# Patient Record
Sex: MALE | Race: WHITE | NOT HISPANIC OR LATINO | Employment: FULL TIME | URBAN - METROPOLITAN AREA
[De-identification: names, ages, dates, MRNs, and addresses within clinical notes are randomized per-mention and may not be internally consistent; named-entity substitution may affect disease eponyms.]

---

## 2024-09-07 ENCOUNTER — HOSPITAL ENCOUNTER (EMERGENCY)
Facility: HOSPITAL | Age: 59
Discharge: HOME | End: 2024-09-07
Attending: EMERGENCY MEDICINE
Payer: MEDICAID

## 2024-09-07 ENCOUNTER — APPOINTMENT (OUTPATIENT)
Dept: CT IMAGING | Facility: HOSPITAL | Age: 59
End: 2024-09-07
Payer: MEDICAID

## 2024-09-07 VITALS
BODY MASS INDEX: 32.93 KG/M2 | WEIGHT: 230 LBS | HEART RATE: 92 BPM | SYSTOLIC BLOOD PRESSURE: 165 MMHG | DIASTOLIC BLOOD PRESSURE: 92 MMHG | HEIGHT: 70 IN | TEMPERATURE: 98.1 F | RESPIRATION RATE: 16 BRPM | OXYGEN SATURATION: 97 %

## 2024-09-07 DIAGNOSIS — E11.65 TYPE 2 DIABETES MELLITUS WITH HYPERGLYCEMIA, UNSPECIFIED WHETHER LONG TERM INSULIN USE: ICD-10-CM

## 2024-09-07 DIAGNOSIS — R11.0 NAUSEA: ICD-10-CM

## 2024-09-07 DIAGNOSIS — R10.10 UPPER ABDOMINAL PAIN: Primary | ICD-10-CM

## 2024-09-07 LAB
ALBUMIN SERPL-MCNC: 4.2 G/DL (ref 3.5–5.2)
ALBUMIN/GLOB SERPL: 1.6 G/DL
ALP SERPL-CCNC: 57 U/L (ref 39–117)
ALT SERPL W P-5'-P-CCNC: 24 U/L (ref 1–41)
ANION GAP SERPL CALCULATED.3IONS-SCNC: 11 MMOL/L (ref 5–15)
AST SERPL-CCNC: 20 U/L (ref 1–40)
BACTERIA UR QL AUTO: NORMAL /HPF
BASOPHILS # BLD AUTO: 0.02 10*3/MM3 (ref 0–0.2)
BASOPHILS NFR BLD AUTO: 0.3 % (ref 0–1.5)
BILIRUB SERPL-MCNC: 0.3 MG/DL (ref 0–1.2)
BILIRUB UR QL STRIP: NEGATIVE
BUN SERPL-MCNC: 17 MG/DL (ref 6–20)
BUN/CREAT SERPL: 13.9 (ref 7–25)
CALCIUM SPEC-SCNC: 9.7 MG/DL (ref 8.6–10.5)
CHLORIDE SERPL-SCNC: 96 MMOL/L (ref 98–107)
CLARITY UR: CLEAR
CO2 SERPL-SCNC: 26 MMOL/L (ref 22–29)
COLOR UR: YELLOW
CREAT SERPL-MCNC: 1.22 MG/DL (ref 0.76–1.27)
DEPRECATED RDW RBC AUTO: 42.4 FL (ref 37–54)
EGFRCR SERPLBLD CKD-EPI 2021: 69.1 ML/MIN/1.73
EOSINOPHIL # BLD AUTO: 0.29 10*3/MM3 (ref 0–0.4)
EOSINOPHIL NFR BLD AUTO: 4 % (ref 0.3–6.2)
ERYTHROCYTE [DISTWIDTH] IN BLOOD BY AUTOMATED COUNT: 12.3 % (ref 12.3–15.4)
GLOBULIN UR ELPH-MCNC: 2.6 GM/DL
GLUCOSE SERPL-MCNC: 299 MG/DL (ref 65–99)
GLUCOSE UR STRIP-MCNC: ABNORMAL MG/DL
HCT VFR BLD AUTO: 50.2 % (ref 37.5–51)
HGB BLD-MCNC: 17.3 G/DL (ref 13–17.7)
HGB UR QL STRIP.AUTO: NEGATIVE
HYALINE CASTS UR QL AUTO: NORMAL /LPF
IMM GRANULOCYTES # BLD AUTO: 0.02 10*3/MM3 (ref 0–0.05)
IMM GRANULOCYTES NFR BLD AUTO: 0.3 % (ref 0–0.5)
KETONES UR QL STRIP: NEGATIVE
LEUKOCYTE ESTERASE UR QL STRIP.AUTO: NEGATIVE
LIPASE SERPL-CCNC: 86 U/L (ref 13–60)
LYMPHOCYTES # BLD AUTO: 2.15 10*3/MM3 (ref 0.7–3.1)
LYMPHOCYTES NFR BLD AUTO: 29.6 % (ref 19.6–45.3)
MCH RBC QN AUTO: 32.3 PG (ref 26.6–33)
MCHC RBC AUTO-ENTMCNC: 34.5 G/DL (ref 31.5–35.7)
MCV RBC AUTO: 93.8 FL (ref 79–97)
MONOCYTES # BLD AUTO: 0.45 10*3/MM3 (ref 0.1–0.9)
MONOCYTES NFR BLD AUTO: 6.2 % (ref 5–12)
NEUTROPHILS NFR BLD AUTO: 4.33 10*3/MM3 (ref 1.7–7)
NEUTROPHILS NFR BLD AUTO: 59.6 % (ref 42.7–76)
NITRITE UR QL STRIP: NEGATIVE
NRBC BLD AUTO-RTO: 0 /100 WBC (ref 0–0.2)
PH UR STRIP.AUTO: 6.5 [PH] (ref 5–8)
PLATELET # BLD AUTO: 288 10*3/MM3 (ref 140–450)
PMV BLD AUTO: 10.1 FL (ref 6–12)
POTASSIUM SERPL-SCNC: 4.4 MMOL/L (ref 3.5–5.2)
PROT SERPL-MCNC: 6.8 G/DL (ref 6–8.5)
PROT UR QL STRIP: ABNORMAL
RBC # BLD AUTO: 5.35 10*6/MM3 (ref 4.14–5.8)
RBC # UR STRIP: NORMAL /HPF
REF LAB TEST METHOD: NORMAL
SODIUM SERPL-SCNC: 133 MMOL/L (ref 136–145)
SP GR UR STRIP: 1.03 (ref 1–1.03)
SQUAMOUS #/AREA URNS HPF: NORMAL /HPF
UROBILINOGEN UR QL STRIP: ABNORMAL
WBC # UR STRIP: NORMAL /HPF
WBC NRBC COR # BLD AUTO: 7.26 10*3/MM3 (ref 3.4–10.8)

## 2024-09-07 PROCEDURE — 96374 THER/PROPH/DIAG INJ IV PUSH: CPT

## 2024-09-07 PROCEDURE — 74177 CT ABD & PELVIS W/CONTRAST: CPT

## 2024-09-07 PROCEDURE — 85025 COMPLETE CBC W/AUTO DIFF WBC: CPT | Performed by: NURSE PRACTITIONER

## 2024-09-07 PROCEDURE — 25510000001 IOPAMIDOL 61 % SOLUTION: Performed by: EMERGENCY MEDICINE

## 2024-09-07 PROCEDURE — 83690 ASSAY OF LIPASE: CPT | Performed by: NURSE PRACTITIONER

## 2024-09-07 PROCEDURE — 81001 URINALYSIS AUTO W/SCOPE: CPT | Performed by: NURSE PRACTITIONER

## 2024-09-07 PROCEDURE — 25810000003 SODIUM CHLORIDE 0.9 % SOLUTION: Performed by: NURSE PRACTITIONER

## 2024-09-07 PROCEDURE — 25010000002 ONDANSETRON PER 1 MG: Performed by: NURSE PRACTITIONER

## 2024-09-07 PROCEDURE — 99285 EMERGENCY DEPT VISIT HI MDM: CPT

## 2024-09-07 PROCEDURE — 80053 COMPREHEN METABOLIC PANEL: CPT | Performed by: NURSE PRACTITIONER

## 2024-09-07 RX ORDER — IOPAMIDOL 612 MG/ML
100 INJECTION, SOLUTION INTRAVASCULAR
Status: COMPLETED | OUTPATIENT
Start: 2024-09-07 | End: 2024-09-07

## 2024-09-07 RX ORDER — ONDANSETRON 4 MG/1
4 TABLET, ORALLY DISINTEGRATING ORAL 4 TIMES DAILY PRN
Qty: 16 TABLET | Refills: 0 | Status: SHIPPED | OUTPATIENT
Start: 2024-09-07

## 2024-09-07 RX ORDER — ONDANSETRON 2 MG/ML
4 INJECTION INTRAMUSCULAR; INTRAVENOUS ONCE
Status: COMPLETED | OUTPATIENT
Start: 2024-09-07 | End: 2024-09-07

## 2024-09-07 RX ORDER — SODIUM CHLORIDE 0.9 % (FLUSH) 0.9 %
10 SYRINGE (ML) INJECTION AS NEEDED
Status: DISCONTINUED | OUTPATIENT
Start: 2024-09-07 | End: 2024-09-07 | Stop reason: HOSPADM

## 2024-09-07 RX ADMIN — SODIUM CHLORIDE 1000 ML: 9 INJECTION, SOLUTION INTRAVENOUS at 13:28

## 2024-09-07 RX ADMIN — IOPAMIDOL 80 ML: 612 INJECTION, SOLUTION INTRAVENOUS at 14:15

## 2024-09-07 RX ADMIN — ONDANSETRON 4 MG: 2 INJECTION INTRAMUSCULAR; INTRAVENOUS at 13:28

## 2024-09-07 NOTE — ED PROVIDER NOTES
EMERGENCY DEPARTMENT ENCOUNTER    Pt Name: Adrien Sweet  MRN: [id number]  Pt :   1965  Room Number:  RW1/R1  Date of encounter:  2024  PCP: No primary care provider on file.  ED Provider: ERASMO Davidson    Historian: Patient    HPI:  Chief Complaint: Abdominal pain    Context: Adrien Sweet is a 59 y.o. male who presents to the ED c/o abdominal pain.  Patient complains of pain across his upper abdomen.  He reports that he has had the pain off and on for the past 2 months.  He complains of nausea with no vomiting.  He explains that it feels like a sunburn however he has a history of liver issues, diverticulitis.  Patient is a diabetic.  He reports a cardiac history.  He advises last bowel movement was this morning.  He denies urinary frequency, burning, urgency.  HPI     REVIEW OF SYSTEMS  A chief complaint appropriate review of systems was completed and is negative except as noted in the HPI.     PAST MEDICAL HISTORY  No past medical history on file.    PAST SURGICAL HISTORY  No past surgical history on file.    FAMILY HISTORY  No family history on file.    SOCIAL HISTORY  Social History     Socioeconomic History    Marital status: Unknown       ALLERGIES  Patient has no known allergies.    PHYSICAL EXAM  Physical Exam  Vitals and nursing note reviewed.   Constitutional:       General: He is not in acute distress.     Appearance: He is well-developed. He is not ill-appearing.   HENT:      Head: Normocephalic and atraumatic.      Mouth/Throat:      Mouth: Mucous membranes are moist.   Eyes:      Extraocular Movements: Extraocular movements intact.      Pupils: Pupils are equal, round, and reactive to light.   Cardiovascular:      Rate and Rhythm: Regular rhythm. Tachycardia present.      Heart sounds: Normal heart sounds.   Pulmonary:      Effort: Pulmonary effort is normal. No respiratory distress.      Breath sounds: Normal breath sounds.   Abdominal:      General: Bowel sounds are  normal.      Palpations: Abdomen is soft.      Tenderness: There is abdominal tenderness in the right upper quadrant, epigastric area and left upper quadrant.   Skin:     General: Skin is warm and dry.   Neurological:      General: No focal deficit present.      Mental Status: He is alert and oriented to person, place, and time.   Psychiatric:         Mood and Affect: Mood normal.         Behavior: Behavior normal.           LAB RESULTS  No results found for this or any previous visit.    If labs were ordered, I independently reviewed the results and considered them in treating the patient.    RADIOLOGY  No orders to display     [] Radiologist's Report Reviewed:  I ordered and independently interpreted the above noted radiographic studies.  See radiologist's dictation for official interpretation.      PROCEDURES    Procedures    No orders to display       MEDICATIONS GIVEN IN ER    Medications - No data to display    MEDICAL DECISION MAKING, PROGRESS, and CONSULTS   Medical Decision Making  Adrien Sweet is a 59 y.o. male who presents to the ED c/o abdominal pain.  Patient complains of pain across his upper abdomen.  He reports that he has had the pain off and on for the past 2 months.  He complains of nausea with no vomiting.  He explains that it feels like a sunburn however he has a history of liver issues, diverticulitis.  Patient is a diabetic.  He reports a cardiac history.  He advises last bowel movement was this morning.  He denies urinary frequency, burning, urgency.    Amount and/or Complexity of Data Reviewed  Labs: ordered. Decision-making details documented in ED Course.  Radiology: ordered. Decision-making details documented in ED Course.    Risk  Prescription drug management.        Discussion below represents my analysis of pertinent findings related to patient's condition, differential diagnosis, treatment plan and final disposition.    Differential diagnosis:  The differential diagnosis  associated with the patient's presentation includes: ***    Additional sources  Discussed/ obtained information from independent historians:   [] Spouse  [] Parent  [] Family member  [] Friend  [] EMS   [] Other:  External (non-ED) record review:   [] Inpatient record:   [] Office record:   [] Outpatient record:   [] Prior Outpatient labs:   [] Prior Outpatient radiology:   [] Primary Care record:   [] Outside ED record:   [] Other:   Patient's care impacted by:   [] Diabetes  [] Hypertension  [] Hyperlipidemia  [] Hypothyroidism   [] Coronary Artery Disease   [] COPD   [] Cancer   [] Obesity  [] GERD   [] Tobacco Abuse   [] Substance Abuse    [] Anxiety   [] Depression   [] Other:   Care significantly affected by Social Determinants of Health (housing and economic circumstances, unemployment)    [] Yes     [] No   If yes, Patient's care significantly limited by  Social Determinants of Health including:   [] Inadequate housing   [] Low income   [] Alcoholism and drug addiction in family   [] Problems related to primary support group   [] Unemployment   [] Problems related to employment   [] Other Social Determinants of Health:   Shared decision making:    Orders placed during this visit:  No orders of the defined types were placed in this encounter.      I considered prescription management  with:   [] Pain medication  [] Antiviral  [] Antibiotic   [] Other:   Rationale:  Additional orders considered but not ordered:  The following testing was considered but ultimately not selected after discussion with patient/family:***    ED Course:    ED Course as of 09/07/24 1440   Sat Sep 07, 2024   1436 Glucose(!): 299  Hyperglycemia history of type 2 diabetes. [KG]   1436 Sodium(!): 133 [KG]   1436 Lipase(!): 86  Elevated lipase.  CT pending. [KG]   1437 WBC: 7.26 [KG]   1437 Hemoglobin: 17.3 [KG]   1437 Hematocrit: 50.2 [KG]   1438 CT of the abdomen and pelvis reviewed:    IMPRESSION:  Impression:     1. No acute findings  in the abdomen or pelvis.   [KG]      ED Course User Index  [KG] Daylin Andrews, ERASMO            DIAGNOSIS  Final diagnoses:   None       DISPOSITION    ED Disposition       None            Please note that portions of this document were completed with voice recognition software.     record:   [x] Prior Outpatient labs:   [x] Prior Outpatient radiology:   [] Primary Care record:   [] Outside ED record:   [] Other:   Patient's care impacted by:   [x] Diabetes  [x] Hypertension  [] Hyperlipidemia  [] Hypothyroidism   [] Coronary Artery Disease   [] COPD   [] Cancer   [] Obesity  [] GERD   [] Tobacco Abuse   [] Substance Abuse    [] Anxiety   [] Depression   [] Other:   Care significantly affected by Social Determinants of Health (housing and economic circumstances, unemployment)    [] Yes     [x] No   If yes, Patient's care significantly limited by  Social Determinants of Health including:   [] Inadequate housing   [] Low income   [] Alcoholism and drug addiction in family   [] Problems related to primary support group   [] Unemployment   [] Problems related to employment   [] Other Social Determinants of Health:   Shared decision making: Shared decision making with patient imaging is negative for acute findings.  We will discharge patient home.  Patient to take Prilosec, Zofran.  Patient to follow-up with GI.  Patient to return to the ED as needed.    Orders placed during this visit:  Orders Placed This Encounter   Procedures    CT Abdomen Pelvis With Contrast    Comprehensive Metabolic Panel    Lipase    Urinalysis With Microscopic If Indicated (No Culture) - Urine, Clean Catch    CBC Auto Differential    Urinalysis, Microscopic Only - Urine, Clean Catch    CBC & Differential       I considered prescription management  with:   [] Pain medication  [] Antiviral  [] Antibiotic   [] Other:   Rationale:  Additional orders considered but not ordered:  The following testing was considered but ultimately not selected after discussion with patient/family:    ED Course:    ED Course as of 09/24/24 2120   Sat Sep 07, 2024   1436 Glucose(!): 299  Hyperglycemia history of type 2 diabetes. [KG]   1436 Sodium(!): 133 [KG]   1436 Lipase(!): 86  Elevated lipase.  CT pending. [KG]   1437 WBC: 7.26 [KG]   1437  Hemoglobin: 17.3 [KG]   1437 Hematocrit: 50.2 [KG]   1438 CT of the abdomen and pelvis reviewed:    IMPRESSION:  Impression:     1. No acute findings in the abdomen or pelvis.   [KG]      ED Course User Index  [KG] DarrylDaylin ramos, ERASMO            DIAGNOSIS  Final diagnoses:   Upper abdominal pain   Nausea   Type 2 diabetes mellitus with hyperglycemia, unspecified whether long term insulin use       DISPOSITION    DISCHARGE    Patient discharged in stable condition.    Reviewed implications of results, diagnosis, meds, responsibility to follow up, warning signs and symptoms of possible worsening, potential complications and reasons to return to ER.    Patient/Family voiced understanding of above instructions.    Discussed plan for discharge, as there is no emergent indication for admission.  Pt/family is agreeable and understands need for follow up and possible repeat testing.  Pt/family is aware that discharge does not mean that nothing is wrong but that it indicates no emergency is currently present that requires admission and they must continue care with follow-up as given below or with a physician of their choice.     FOLLOW-UP  Koko Bond MD  [address]  [address] [place] [address]  [phone]               Medication List        New Prescriptions      omeprazole 20 MG capsule  Commonly known as: priLOSEC  Take 1 capsule by mouth Daily for 30 days.     ondansetron ODT 4 MG disintegrating tablet  Commonly known as: ZOFRAN-ODT  Place 1 tablet on the tongue 4 (Four) Times a Day As Needed for Nausea.               Where to Get Your Medications        These medications were sent to [id number] - [place], [place] - [address] AT [facility] [address] - [phone] [facility] [phone] [facility]  [address], [facility] [address]      Phone: [phone]   omeprazole 20 MG capsule  ondansetron ODT 4 MG disintegrating tablet          ED Disposition       ED  Disposition   Discharge    Condition   Stable    Comment   --               Please note that portions of this document were completed with voice recognition software.       Daylin Andrews, APRN  09/24/24 2274

## 2024-09-07 NOTE — DISCHARGE INSTRUCTIONS
Continue to monitor your blood sugar.    Take Zofran for nausea.    Take omeprazole for your upper abdominal pain.

## 2024-12-08 ENCOUNTER — APPOINTMENT (OUTPATIENT)
Dept: GENERAL RADIOLOGY | Facility: HOSPITAL | Age: 59
End: 2024-12-08
Payer: MEDICAID

## 2024-12-08 ENCOUNTER — HOSPITAL ENCOUNTER (INPATIENT)
Facility: HOSPITAL | Age: 59
LOS: 1 days | Discharge: HOME | End: 2024-12-10
Attending: EMERGENCY MEDICINE | Admitting: FAMILY MEDICINE
Payer: MEDICAID

## 2024-12-08 DIAGNOSIS — I21.4 NSTEMI (NON-ST ELEVATED MYOCARDIAL INFARCTION): Primary | ICD-10-CM

## 2024-12-08 DIAGNOSIS — R07.9 CHEST PAIN, UNSPECIFIED TYPE: ICD-10-CM

## 2024-12-08 PROBLEM — Z72.0 TOBACCO USE: Status: ACTIVE | Noted: 2024-12-08

## 2024-12-08 PROBLEM — Z79.4 TYPE 2 DIABETES MELLITUS WITH CIRCULATORY DISORDER, WITH LONG-TERM CURRENT USE OF INSULIN: Status: ACTIVE | Noted: 2024-12-08

## 2024-12-08 PROBLEM — E11.59 TYPE 2 DIABETES MELLITUS WITH CIRCULATORY DISORDER, WITH LONG-TERM CURRENT USE OF INSULIN: Status: ACTIVE | Noted: 2024-12-08

## 2024-12-08 PROBLEM — E78.5 HLD (HYPERLIPIDEMIA): Status: ACTIVE | Noted: 2024-12-08

## 2024-12-08 PROBLEM — I25.10 CAD (CORONARY ARTERY DISEASE): Status: ACTIVE | Noted: 2024-12-08

## 2024-12-08 PROBLEM — I10 HTN (HYPERTENSION): Status: ACTIVE | Noted: 2024-12-08

## 2024-12-08 LAB
ALBUMIN SERPL-MCNC: 4.1 G/DL (ref 3.5–5.2)
ALBUMIN/GLOB SERPL: 1.6 G/DL
ALP SERPL-CCNC: 64 U/L (ref 39–117)
ALT SERPL W P-5'-P-CCNC: 47 U/L (ref 1–41)
ANION GAP SERPL CALCULATED.3IONS-SCNC: 13 MMOL/L (ref 5–15)
APTT PPP: 27.6 SECONDS (ref 60–90)
AST SERPL-CCNC: 37 U/L (ref 1–40)
BASOPHILS # BLD AUTO: 0.03 10*3/MM3 (ref 0–0.2)
BASOPHILS NFR BLD AUTO: 0.4 % (ref 0–1.5)
BILIRUB SERPL-MCNC: 0.3 MG/DL (ref 0–1.2)
BUN SERPL-MCNC: 24 MG/DL (ref 6–20)
BUN/CREAT SERPL: 23.3 (ref 7–25)
CALCIUM SPEC-SCNC: 9.4 MG/DL (ref 8.6–10.5)
CHLORIDE SERPL-SCNC: 100 MMOL/L (ref 98–107)
CO2 SERPL-SCNC: 24 MMOL/L (ref 22–29)
CREAT SERPL-MCNC: 1.03 MG/DL (ref 0.76–1.27)
DEPRECATED RDW RBC AUTO: 43.7 FL (ref 37–54)
EGFRCR SERPLBLD CKD-EPI 2021: 84.7 ML/MIN/1.73
EOSINOPHIL # BLD AUTO: 0.16 10*3/MM3 (ref 0–0.4)
EOSINOPHIL NFR BLD AUTO: 2 % (ref 0.3–6.2)
ERYTHROCYTE [DISTWIDTH] IN BLOOD BY AUTOMATED COUNT: 12.6 % (ref 12.3–15.4)
GEN 5 1HR TROPONIN T REFLEX: 42 NG/L
GLOBULIN UR ELPH-MCNC: 2.6 GM/DL
GLUCOSE BLDC GLUCOMTR-MCNC: 323 MG/DL (ref 70–130)
GLUCOSE SERPL-MCNC: 284 MG/DL (ref 65–99)
HCT VFR BLD AUTO: 48.6 % (ref 37.5–51)
HGB BLD-MCNC: 16.8 G/DL (ref 13–17.7)
HOLD SPECIMEN: NORMAL
IMM GRANULOCYTES # BLD AUTO: 0.02 10*3/MM3 (ref 0–0.05)
IMM GRANULOCYTES NFR BLD AUTO: 0.2 % (ref 0–0.5)
INR PPP: 0.94 (ref 0.89–1.12)
LIPASE SERPL-CCNC: 197 U/L (ref 13–60)
LYMPHOCYTES # BLD AUTO: 1.98 10*3/MM3 (ref 0.7–3.1)
LYMPHOCYTES NFR BLD AUTO: 24.4 % (ref 19.6–45.3)
MCH RBC QN AUTO: 32.6 PG (ref 26.6–33)
MCHC RBC AUTO-ENTMCNC: 34.6 G/DL (ref 31.5–35.7)
MCV RBC AUTO: 94.4 FL (ref 79–97)
MONOCYTES # BLD AUTO: 0.4 10*3/MM3 (ref 0.1–0.9)
MONOCYTES NFR BLD AUTO: 4.9 % (ref 5–12)
NEUTROPHILS NFR BLD AUTO: 5.52 10*3/MM3 (ref 1.7–7)
NEUTROPHILS NFR BLD AUTO: 68.1 % (ref 42.7–76)
NRBC BLD AUTO-RTO: 0 /100 WBC (ref 0–0.2)
NT-PROBNP SERPL-MCNC: 314.7 PG/ML (ref 0–900)
PLATELET # BLD AUTO: 252 10*3/MM3 (ref 140–450)
PMV BLD AUTO: 10.1 FL (ref 6–12)
POTASSIUM SERPL-SCNC: 4.3 MMOL/L (ref 3.5–5.2)
PROT SERPL-MCNC: 6.7 G/DL (ref 6–8.5)
PROTHROMBIN TIME: 12.6 SECONDS (ref 12.2–14.5)
QT INTERVAL: 378 MS
QT INTERVAL: 398 MS
QTC INTERVAL: 467 MS
QTC INTERVAL: 467 MS
RBC # BLD AUTO: 5.15 10*6/MM3 (ref 4.14–5.8)
SODIUM SERPL-SCNC: 137 MMOL/L (ref 136–145)
TROPONIN T NUMERIC DELTA: -1 NG/L
TROPONIN T SERPL HS-MCNC: 43 NG/L
UFH PPP CHRO-ACNC: 0.1 IU/ML (ref 0.3–0.7)
UFH PPP CHRO-ACNC: 0.1 IU/ML (ref 0.3–0.7)
WBC NRBC COR # BLD AUTO: 8.11 10*3/MM3 (ref 3.4–10.8)
WHOLE BLOOD HOLD COAG: NORMAL
WHOLE BLOOD HOLD SPECIMEN: NORMAL

## 2024-12-08 PROCEDURE — 80053 COMPREHEN METABOLIC PANEL: CPT | Performed by: EMERGENCY MEDICINE

## 2024-12-08 PROCEDURE — 85610 PROTHROMBIN TIME: CPT | Performed by: EMERGENCY MEDICINE

## 2024-12-08 PROCEDURE — 71045 X-RAY EXAM CHEST 1 VIEW: CPT

## 2024-12-08 PROCEDURE — 25010000002 HEPARIN (PORCINE) PER 1000 UNITS

## 2024-12-08 PROCEDURE — 63710000001 INSULIN LISPRO (HUMAN) PER 5 UNITS: Performed by: NURSE PRACTITIONER

## 2024-12-08 PROCEDURE — 82948 REAGENT STRIP/BLOOD GLUCOSE: CPT

## 2024-12-08 PROCEDURE — 83880 ASSAY OF NATRIURETIC PEPTIDE: CPT | Performed by: EMERGENCY MEDICINE

## 2024-12-08 PROCEDURE — 99285 EMERGENCY DEPT VISIT HI MDM: CPT

## 2024-12-08 PROCEDURE — G0378 HOSPITAL OBSERVATION PER HR: HCPCS

## 2024-12-08 PROCEDURE — 93005 ELECTROCARDIOGRAM TRACING: CPT

## 2024-12-08 PROCEDURE — 85025 COMPLETE CBC W/AUTO DIFF WBC: CPT | Performed by: EMERGENCY MEDICINE

## 2024-12-08 PROCEDURE — 85520 HEPARIN ASSAY: CPT

## 2024-12-08 PROCEDURE — 93010 ELECTROCARDIOGRAM REPORT: CPT | Performed by: STUDENT IN AN ORGANIZED HEALTH CARE EDUCATION/TRAINING PROGRAM

## 2024-12-08 PROCEDURE — 93005 ELECTROCARDIOGRAM TRACING: CPT | Performed by: EMERGENCY MEDICINE

## 2024-12-08 PROCEDURE — 25010000002 HEPARIN (PORCINE) 25000-0.45 UT/250ML-% SOLUTION: Performed by: EMERGENCY MEDICINE

## 2024-12-08 PROCEDURE — 85730 THROMBOPLASTIN TIME PARTIAL: CPT | Performed by: EMERGENCY MEDICINE

## 2024-12-08 PROCEDURE — 99222 1ST HOSP IP/OBS MODERATE 55: CPT | Performed by: NURSE PRACTITIONER

## 2024-12-08 PROCEDURE — 85520 HEPARIN ASSAY: CPT | Performed by: EMERGENCY MEDICINE

## 2024-12-08 PROCEDURE — 84484 ASSAY OF TROPONIN QUANT: CPT | Performed by: EMERGENCY MEDICINE

## 2024-12-08 PROCEDURE — 25010000002 HEPARIN (PORCINE) PER 1000 UNITS: Performed by: EMERGENCY MEDICINE

## 2024-12-08 PROCEDURE — 83690 ASSAY OF LIPASE: CPT | Performed by: EMERGENCY MEDICINE

## 2024-12-08 PROCEDURE — 93005 ELECTROCARDIOGRAM TRACING: CPT | Performed by: NURSE PRACTITIONER

## 2024-12-08 PROCEDURE — 36415 COLL VENOUS BLD VENIPUNCTURE: CPT

## 2024-12-08 RX ORDER — SODIUM CHLORIDE 0.9 % (FLUSH) 0.9 %
10 SYRINGE (ML) INJECTION AS NEEDED
Status: DISCONTINUED | OUTPATIENT
Start: 2024-12-08 | End: 2024-12-10 | Stop reason: HOSPADM

## 2024-12-08 RX ORDER — HEPARIN SODIUM 10000 [USP'U]/100ML
15 INJECTION, SOLUTION INTRAVENOUS
Status: DISCONTINUED | OUTPATIENT
Start: 2024-12-08 | End: 2024-12-09

## 2024-12-08 RX ORDER — VALACYCLOVIR HYDROCHLORIDE 500 MG/1
1000 TABLET, FILM COATED ORAL 3 TIMES DAILY
Status: ON HOLD | COMMUNITY
End: 2024-12-10

## 2024-12-08 RX ORDER — SODIUM CHLORIDE 9 MG/ML
40 INJECTION, SOLUTION INTRAVENOUS AS NEEDED
Status: DISCONTINUED | OUTPATIENT
Start: 2024-12-08 | End: 2024-12-10 | Stop reason: HOSPADM

## 2024-12-08 RX ORDER — CARVEDILOL 6.25 MG/1
6.25 TABLET ORAL 2 TIMES DAILY WITH MEALS
COMMUNITY
End: 2024-12-10 | Stop reason: HOSPADM

## 2024-12-08 RX ORDER — ROSUVASTATIN CALCIUM 40 MG/1
40 TABLET, COATED ORAL DAILY
COMMUNITY

## 2024-12-08 RX ORDER — ACETAMINOPHEN 650 MG/1
650 SUPPOSITORY RECTAL EVERY 4 HOURS PRN
Status: DISCONTINUED | OUTPATIENT
Start: 2024-12-08 | End: 2024-12-10 | Stop reason: HOSPADM

## 2024-12-08 RX ORDER — IBUPROFEN 600 MG/1
1 TABLET ORAL
Status: DISCONTINUED | OUTPATIENT
Start: 2024-12-08 | End: 2024-12-10 | Stop reason: HOSPADM

## 2024-12-08 RX ORDER — DEXTROSE MONOHYDRATE 25 G/50ML
25 INJECTION, SOLUTION INTRAVENOUS
Status: DISCONTINUED | OUTPATIENT
Start: 2024-12-08 | End: 2024-12-10 | Stop reason: HOSPADM

## 2024-12-08 RX ORDER — ASPIRIN 81 MG/1
81 TABLET ORAL DAILY
COMMUNITY

## 2024-12-08 RX ORDER — GLIMEPIRIDE 4 MG/1
4 TABLET ORAL 2 TIMES DAILY
COMMUNITY

## 2024-12-08 RX ORDER — NICOTINE POLACRILEX 4 MG
15 LOZENGE BUCCAL
Status: DISCONTINUED | OUTPATIENT
Start: 2024-12-08 | End: 2024-12-10 | Stop reason: HOSPADM

## 2024-12-08 RX ORDER — HEPARIN SODIUM 1000 [USP'U]/ML
4000 INJECTION, SOLUTION INTRAVENOUS; SUBCUTANEOUS ONCE
Status: COMPLETED | OUTPATIENT
Start: 2024-12-09 | End: 2024-12-08

## 2024-12-08 RX ORDER — CLOPIDOGREL BISULFATE 75 MG/1
75 TABLET ORAL DAILY
Status: DISCONTINUED | OUTPATIENT
Start: 2024-12-09 | End: 2024-12-10 | Stop reason: HOSPADM

## 2024-12-08 RX ORDER — AMLODIPINE BESYLATE 5 MG/1
10 TABLET ORAL DAILY
Status: DISCONTINUED | OUTPATIENT
Start: 2024-12-09 | End: 2024-12-10 | Stop reason: HOSPADM

## 2024-12-08 RX ORDER — HEPARIN SODIUM 1000 [USP'U]/ML
4000 INJECTION, SOLUTION INTRAVENOUS; SUBCUTANEOUS AS NEEDED
Status: DISCONTINUED | OUTPATIENT
Start: 2024-12-08 | End: 2024-12-08

## 2024-12-08 RX ORDER — LOSARTAN POTASSIUM 100 MG/1
100 TABLET ORAL DAILY
COMMUNITY

## 2024-12-08 RX ORDER — AMLODIPINE BESYLATE 10 MG/1
10 TABLET ORAL DAILY
Status: ON HOLD | COMMUNITY
End: 2024-12-10

## 2024-12-08 RX ORDER — ACETAMINOPHEN 325 MG/1
650 TABLET ORAL EVERY 4 HOURS PRN
Status: DISCONTINUED | OUTPATIENT
Start: 2024-12-08 | End: 2024-12-10 | Stop reason: HOSPADM

## 2024-12-08 RX ORDER — INSULIN LISPRO 100 [IU]/ML
2-9 INJECTION, SOLUTION INTRAVENOUS; SUBCUTANEOUS
Status: DISCONTINUED | OUTPATIENT
Start: 2024-12-08 | End: 2024-12-10 | Stop reason: HOSPADM

## 2024-12-08 RX ORDER — CARVEDILOL 6.25 MG/1
6.25 TABLET ORAL 2 TIMES DAILY WITH MEALS
Status: DISCONTINUED | OUTPATIENT
Start: 2024-12-08 | End: 2024-12-09

## 2024-12-08 RX ORDER — ONDANSETRON 4 MG/1
4 TABLET, ORALLY DISINTEGRATING ORAL EVERY 6 HOURS PRN
Status: DISCONTINUED | OUTPATIENT
Start: 2024-12-08 | End: 2024-12-10 | Stop reason: HOSPADM

## 2024-12-08 RX ORDER — ASPIRIN 81 MG/1
81 TABLET ORAL DAILY
Status: DISCONTINUED | OUTPATIENT
Start: 2024-12-09 | End: 2024-12-10 | Stop reason: HOSPADM

## 2024-12-08 RX ORDER — COLCHICINE 0.6 MG/1
0.6 TABLET ORAL DAILY PRN
COMMUNITY

## 2024-12-08 RX ORDER — SODIUM CHLORIDE 0.9 % (FLUSH) 0.9 %
10 SYRINGE (ML) INJECTION EVERY 12 HOURS SCHEDULED
Status: DISCONTINUED | OUTPATIENT
Start: 2024-12-08 | End: 2024-12-10 | Stop reason: HOSPADM

## 2024-12-08 RX ORDER — AMOXICILLIN 250 MG
2 CAPSULE ORAL 2 TIMES DAILY
Status: DISCONTINUED | OUTPATIENT
Start: 2024-12-08 | End: 2024-12-10 | Stop reason: HOSPADM

## 2024-12-08 RX ORDER — TESTOSTERONE ENANTHATE 75 MG/.5ML
75 INJECTION SUBCUTANEOUS WEEKLY
COMMUNITY
End: 2024-12-10 | Stop reason: HOSPADM

## 2024-12-08 RX ORDER — BISACODYL 10 MG
10 SUPPOSITORY, RECTAL RECTAL DAILY PRN
Status: DISCONTINUED | OUTPATIENT
Start: 2024-12-08 | End: 2024-12-10 | Stop reason: HOSPADM

## 2024-12-08 RX ORDER — POLYETHYLENE GLYCOL 3350 17 G/17G
17 POWDER, FOR SOLUTION ORAL DAILY PRN
Status: DISCONTINUED | OUTPATIENT
Start: 2024-12-08 | End: 2024-12-10 | Stop reason: HOSPADM

## 2024-12-08 RX ORDER — HEPARIN SODIUM 1000 [USP'U]/ML
4000 INJECTION, SOLUTION INTRAVENOUS; SUBCUTANEOUS ONCE
Status: COMPLETED | OUTPATIENT
Start: 2024-12-08 | End: 2024-12-08

## 2024-12-08 RX ORDER — ONDANSETRON 2 MG/ML
4 INJECTION INTRAMUSCULAR; INTRAVENOUS EVERY 6 HOURS PRN
Status: DISCONTINUED | OUTPATIENT
Start: 2024-12-08 | End: 2024-12-10 | Stop reason: HOSPADM

## 2024-12-08 RX ORDER — LOSARTAN POTASSIUM 50 MG/1
100 TABLET ORAL DAILY
Status: DISCONTINUED | OUTPATIENT
Start: 2024-12-09 | End: 2024-12-10 | Stop reason: HOSPADM

## 2024-12-08 RX ORDER — ASPIRIN 81 MG/1
324 TABLET, CHEWABLE ORAL ONCE
Status: COMPLETED | OUTPATIENT
Start: 2024-12-08 | End: 2024-12-08

## 2024-12-08 RX ORDER — NITROGLYCERIN 0.4 MG/1
0.4 TABLET SUBLINGUAL
Status: DISCONTINUED | OUTPATIENT
Start: 2024-12-08 | End: 2024-12-10 | Stop reason: HOSPADM

## 2024-12-08 RX ORDER — FENOFIBRATE 145 MG/1
145 TABLET, COATED ORAL DAILY
COMMUNITY

## 2024-12-08 RX ORDER — INSULIN LISPRO 100 [IU]/ML
5 INJECTION, SOLUTION INTRAVENOUS; SUBCUTANEOUS AS NEEDED
COMMUNITY

## 2024-12-08 RX ORDER — BISACODYL 5 MG/1
5 TABLET, DELAYED RELEASE ORAL DAILY PRN
Status: DISCONTINUED | OUTPATIENT
Start: 2024-12-08 | End: 2024-12-10 | Stop reason: HOSPADM

## 2024-12-08 RX ORDER — HEPARIN SODIUM 1000 [USP'U]/ML
2000 INJECTION, SOLUTION INTRAVENOUS; SUBCUTANEOUS AS NEEDED
Status: DISCONTINUED | OUTPATIENT
Start: 2024-12-08 | End: 2024-12-08

## 2024-12-08 RX ORDER — ACETAMINOPHEN 160 MG/5ML
650 SOLUTION ORAL EVERY 4 HOURS PRN
Status: DISCONTINUED | OUTPATIENT
Start: 2024-12-08 | End: 2024-12-10 | Stop reason: HOSPADM

## 2024-12-08 RX ORDER — CLOPIDOGREL BISULFATE 75 MG/1
75 TABLET ORAL DAILY
COMMUNITY

## 2024-12-08 RX ORDER — ROSUVASTATIN CALCIUM 20 MG/1
40 TABLET, COATED ORAL DAILY
Status: DISCONTINUED | OUTPATIENT
Start: 2024-12-09 | End: 2024-12-10 | Stop reason: HOSPADM

## 2024-12-08 RX ADMIN — ASPIRIN 81 MG 324 MG: 81 TABLET ORAL at 14:02

## 2024-12-08 RX ADMIN — HEPARIN SODIUM 10 UNITS/KG/HR: 10000 INJECTION, SOLUTION INTRAVENOUS at 15:36

## 2024-12-08 RX ADMIN — HEPARIN SODIUM 4000 UNITS: 1000 INJECTION, SOLUTION INTRAVENOUS; SUBCUTANEOUS at 15:34

## 2024-12-08 RX ADMIN — INSULIN LISPRO 7 UNITS: 100 INJECTION, SOLUTION INTRAVENOUS; SUBCUTANEOUS at 21:48

## 2024-12-08 RX ADMIN — HEPARIN SODIUM 4000 UNITS: 1000 INJECTION INTRAVENOUS; SUBCUTANEOUS at 23:58

## 2024-12-08 RX ADMIN — CARVEDILOL 6.25 MG: 6.25 TABLET, FILM COATED ORAL at 21:48

## 2024-12-08 NOTE — Clinical Note
First balloon inflation max pressure = 12 zayda. First balloon inflation duration = 15 seconds. Second inflation of balloon - Max pressure = 12 zayad. 2nd Inflation of balloon - Duration = 10 seconds. 2nd inflation was done at 14:38 EST. The balloon is positioned in the Distal segment of the vessel. Third inflation of balloon - Max pressure = 15 zayda. 3rd Inflation of balloon - Duration = 10 seconds. 3rd inflation was done at 14:38 EST. Four th inflation of balloon - Max pressure = 15 zayda. 4th Inflation of balloon - Duration = 15 seconds. 4th inflation was done at 14:39 EST.

## 2024-12-08 NOTE — Clinical Note
First balloon inflation max pressure = 12 zayda. First balloon inflation duration = 15 seconds. Second inflation of balloon - Max pressure = 12 zayda. 2nd Inflation of balloon - Duration = 10 seconds. Third inflation of balloon - Max pressure = 14 zayda. 3rd Inflation of balloon - Duration = 10 seconds.

## 2024-12-08 NOTE — Clinical Note
First balloon inflation max pressure = 12 zayda. First balloon inflation duration = 5 seconds. Second inflation of balloon - Max pressure = 12 zayda. 2nd Inflation of balloon - Duration = 10 seconds. 2nd inflation was done at 14:46 EST.

## 2024-12-08 NOTE — ED PROVIDER NOTES
[place]    EMERGENCY DEPARTMENT ENCOUNTER      Pt Name: Adrien Sweet  MRN: [id number]  YOB: 1965  Date of evaluation: 12/8/2024  Provider: Adalberto Hallman DO    CHIEF COMPLAINT       Chief Complaint   Patient presents with    Chest Pain       HPI  Stated Reason for Visit: pt states midsternal chest pain and tightness since last night. states non radiating at the moment. hx of MI History Obtained From: patient       HISTORY OF PRESENT ILLNESS  (Location/Symptom, Timing/Onset, Context/Setting, Quality, Duration, Modifying Factors, Severity.)   Adrien Sweet is a 59 y.o. male who presents to the emergency department for evaluation of intermittent chest pain since last night has been waxing waning in severity, lasting about 30 minutes or so and then spontaneously resolved.  He does have a history significant for underlying cardiac disease.  The patient has had multiple stents in the past approximately 7 with 3 prior heart attacks.  He just recently moved 4 months ago from [place] to [place] follows with a specialist out of [facility].  He does some intermittent angina at rest, does not have any short acting nitroglycerin.  No nausea or vomiting, no shortness of breath.  He does state he picked up smoking again recently after the loss of her friend, drinks occasional alcohol.  He has been compliant with his medications for hypertension cholesterol and diabetes.  Currently at this time does not have any acute cardiac discomfort or pain.  Similar symptoms to his previous MIs which prompted his assessment this afternoon.    Nursing notes were reviewed.      PAST MEDICAL HISTORY     Past Medical History:   Diagnosis Date    Diabetes mellitus     Diverticulosis     Fatty liver     Hyperlipidemia     Hypertension     Myocardial infarction          SURGICAL HISTORY       Past Surgical History:   Procedure Laterality Date    CARDIAC CATHETERIZATION      CORONARY STENT PLACEMENT            CURRENT MEDICATIONS       Current Facility-Administered Medications:     heparin 72044 units/250 mL (100 units/mL) in 0.45 % NaCl infusion, 10 Units/kg/hr, Intravenous, Titrated, Adalberto Hallman DO, Last Rate: 9.98 mL/hr at 12/08/24 1536, 10 Units/kg/hr at 12/08/24 1536    Pharmacy to Dose Heparin, , Not Applicable, Continuous PRN, Adalberto Hallman DO    sodium chloride 0.9 % flush 10 mL, 10 mL, Intravenous, PRN, Adalberto Hallman DO    Current Outpatient Medications:     amLODIPine (NORVASC) 10 MG tablet, Take 1 tablet by mouth Daily., Disp: , Rfl:     aspirin 81 MG EC tablet, Take 1 tablet by mouth Daily., Disp: , Rfl:     carvedilol (COREG) 6.25 MG tablet, Take 1 tablet by mouth 2 (Two) Times a Day With Meals., Disp: , Rfl:     clopidogrel (PLAVIX) 75 MG tablet, Take 1 tablet by mouth Daily., Disp: , Rfl:     colchicine 0.6 MG tablet, Take 1 tablet by mouth Daily As Needed., Disp: , Rfl:     empagliflozin (Jardiance) 10 MG tablet tablet, Take 1 tablet by mouth Daily., Disp: , Rfl:     fenofibrate (TRICOR) 145 MG tablet, Take 1 tablet by mouth Daily., Disp: , Rfl:     FLUoxetine (PROzac) 20 MG capsule, Take 1 capsule by mouth Daily., Disp: , Rfl:     glimepiride (AMARYL) 4 MG tablet, Take 1 tablet by mouth 2 (Two) Times a Day., Disp: , Rfl:     Insulin Lispro (humaLOG) 100 UNIT/ML injection, Inject 5 Units under the skin into the appropriate area as directed As Needed for High Blood Sugar. 5 UNITS FOR EVERY 50 BLOOD SUGAR OVER 200. UP TO 50 UNITS DAILY., Disp: , Rfl:     losartan (COZAAR) 100 MG tablet, Take 1 tablet by mouth Daily., Disp: , Rfl:     Melatonin-Pyridoxine (MELATONEX PO), Take 3 mg by mouth every night at bedtime., Disp: , Rfl:     metFORMIN (GLUCOPHAGE) 500 MG tablet, Take 1 tablet by mouth 2 (Two) Times a Day With Meals. 4 TABLETS AT BREAKFAST, Disp: , Rfl:     ondansetron ODT (ZOFRAN-ODT) 4 MG disintegrating tablet, Place 1 tablet on the tongue 4 (Four) Times a Day As  "Needed for Nausea., Disp: 16 tablet, Rfl: 0    rosuvastatin (CRESTOR) 40 MG tablet, Take 1 tablet by mouth Daily., Disp: , Rfl:     Testosterone Enanthate (Xyosted) 75 MG/0.5ML solution auto-injector, Inject 75 mg under the skin into the appropriate area as directed 1 (One) Time Per Week., Disp: , Rfl:     valACYclovir (VALTREX) 500 MG tablet, Take 2 tablets by mouth 3 (Three) Times a Day., Disp: , Rfl:     ALLERGIES     Patient has no known allergies.    FAMILY HISTORY       Family History   Problem Relation Age of Onset    Cancer Maternal Uncle     Diabetes Maternal Grandmother           SOCIAL HISTORY       Social History     Socioeconomic History    Marital status: Single   Tobacco Use    Smoking status: Every Day     Types: Cigarettes   Vaping Use    Vaping status: Never Used   Substance and Sexual Activity    Alcohol use: Yes     Comment: 2X WEEKLY    Drug use: Yes     Types: Marijuana     Comment: OCCASIONAL         PHYSICAL EXAM    (up to 7 for level 4, 8 or more for level 5)     Vitals:    12/08/24 1322 12/08/24 1430 12/08/24 1500 12/08/24 1530   BP: (!) 190/100 150/89 (!) 161/102 (!) 148/110   BP Location: Left arm      Patient Position: Sitting      Pulse: 96 80 85 80   Resp: 18      Temp: 97.6 °F (36.4 °C)      TempSrc: Oral      SpO2: 96% 94% 95% 94%   Weight: 99.8 kg (220 lb)      Height: 177.8 cm (70\")          Physical Exam  General : Patient is awake, alert, oriented, in no acute distress, nontoxic appearing  HEENT: Pupils are equally round, EOMI, conjunctivae clear, there is no injection no icterus.  Oral mucosa is moist, uvula midline  Neck: Neck is supple, full range of motion, trachea midline  Cardiac: Heart regular rate, rhythm, no murmurs, rubs, or gallops  Lungs: Lungs are clear to auscultation, there is no wheezing, rhonchi, or rales. There is no use of accessory muscles  Chest wall: There is no tenderness to palpation over the chest wall or over ribs  Abdomen: Abdomen is soft, nontender, " nondistended. There are no firm or pulsatile masses, no rebound rigidity or guarding  Musculoskeletal: No peripheral edema, 5 out of 5 strength in all 4 extremities.  No focal muscle deficits are appreciated  Neuro: Motor intact, sensory intact, level of consciousness is normal  Dermatology: Skin is warm and dry  Psych: Mentation is grossly normal, cognition is grossly normal. Affect is appropriate      DIAGNOSTIC RESULTS     EKG:  All EKGs are interpreted by the Emergency Department Physician who either signs or Co-signs this chart in the absence of a cardiologist.    ECG 12 Lead ED Triage Standing Order; Chest Pain   Preliminary Result   Test Reason : ED Triage Standing Order~   Blood Pressure :   */*   mmHG   Vent. Rate :  83 BPM     Atrial Rate :  83 BPM      P-R Int : 136 ms          QRS Dur :  94 ms       QT Int : 398 ms       P-R-T Axes :  51  64  72 degrees     QTcB Int : 467 ms      Normal sinus rhythm   Possible Left atrial enlargement   Borderline ECG   When compared with ECG of 08-Dec-2024 13:28,   No significant change was found      Referred By: ADAN           Confirmed By:       ECG 12 Lead ED Triage Standing Order; Chest Pain   Final Result   Test Reason : ED Triage Standing Order~   Blood Pressure :   */*   mmHG   Vent. Rate :  92 BPM     Atrial Rate :  92 BPM      P-R Int : 130 ms          QRS Dur :  90 ms       QT Int : 378 ms       P-R-T Axes :  50  73  52 degrees     QTcB Int : 467 ms      Normal sinus rhythm   Possible Left atrial enlargement   Borderline ECG   No previous ECGs available   Confirmed by CADEN ARELLANO MD ([id number]) on 12/8/2024 1:44:32 PM      Referred By: EDMD           Confirmed By: CADEN ARELLANO MD          RADIOLOGY:     [x] Radiologist's Report Reviewed:  XR Chest 1 View   Final Result   Impression:   No acute cardiopulmonary abnormality is identified.            Electronically Signed: Alesha Wheeler     12/8/2024 2:56 PM EST     Workstation ID: [id number]          I ordered  and independently reviewed the above noted radiographic studies.      I viewed images of chest x-ray which showed no acute cardiopulmonary process per my independent interpretation.    See radiologist's dictation for official interpretation.      ED BEDSIDE ULTRASOUND:   Performed by ED Physician - none    LABS:    I have reviewed and interpreted all of the currently available lab results from this visit (if applicable):  Results for orders placed or performed during the hospital encounter of 12/08/24   ECG 12 Lead ED Triage Standing Order; Chest Pain    Collection Time: 12/08/24  1:28 PM   Result Value Ref Range    QT Interval 378 ms    QTC Interval 467 ms   High Sensitivity Troponin T    Collection Time: 12/08/24  2:08 PM    Specimen: Blood   Result Value Ref Range    HS Troponin T 43 (H) <22 ng/L   Comprehensive Metabolic Panel    Collection Time: 12/08/24  2:08 PM    Specimen: Blood   Result Value Ref Range    Glucose 284 (H) 65 - 99 mg/dL    BUN 24 (H) 6 - 20 mg/dL    Creatinine 1.03 0.76 - 1.27 mg/dL    Sodium 137 136 - 145 mmol/L    Potassium 4.3 3.5 - 5.2 mmol/L    Chloride 100 98 - 107 mmol/L    CO2 24.0 22.0 - 29.0 mmol/L    Calcium 9.4 8.6 - 10.5 mg/dL    Total Protein 6.7 6.0 - 8.5 g/dL    Albumin 4.1 3.5 - 5.2 g/dL    ALT (SGPT) 47 (H) 1 - 41 U/L    AST (SGOT) 37 1 - 40 U/L    Alkaline Phosphatase 64 39 - 117 U/L    Total Bilirubin 0.3 0.0 - 1.2 mg/dL    Globulin 2.6 gm/dL    A/G Ratio 1.6 g/dL    BUN/Creatinine Ratio 23.3 7.0 - 25.0    Anion Gap 13.0 5.0 - 15.0 mmol/L    eGFR 84.7 >60.0 mL/min/1.73   Lipase    Collection Time: 12/08/24  2:08 PM    Specimen: Blood   Result Value Ref Range    Lipase 197 (H) 13 - 60 U/L   BNP    Collection Time: 12/08/24  2:08 PM    Specimen: Blood   Result Value Ref Range    proBNP 314.7 0.0 - 900.0 pg/mL   CBC Auto Differential    Collection Time: 12/08/24  2:08 PM    Specimen: Blood   Result Value Ref Range    WBC 8.11 3.40 - 10.80 10*3/mm3    RBC 5.15 4.14 - 5.80  10*6/mm3    Hemoglobin 16.8 13.0 - 17.7 g/dL    Hematocrit 48.6 37.5 - 51.0 %    MCV 94.4 79.0 - 97.0 fL    MCH 32.6 26.6 - 33.0 pg    MCHC 34.6 31.5 - 35.7 g/dL    RDW 12.6 12.3 - 15.4 %    RDW-SD 43.7 37.0 - 54.0 fl    MPV 10.1 6.0 - 12.0 fL    Platelets 252 140 - 450 10*3/mm3    Neutrophil % 68.1 42.7 - 76.0 %    Lymphocyte % 24.4 19.6 - 45.3 %    Monocyte % 4.9 (L) 5.0 - 12.0 %    Eosinophil % 2.0 0.3 - 6.2 %    Basophil % 0.4 0.0 - 1.5 %    Immature Grans % 0.2 0.0 - 0.5 %    Neutrophils, Absolute 5.52 1.70 - 7.00 10*3/mm3    Lymphocytes, Absolute 1.98 0.70 - 3.10 10*3/mm3    Monocytes, Absolute 0.40 0.10 - 0.90 10*3/mm3    Eosinophils, Absolute 0.16 0.00 - 0.40 10*3/mm3    Basophils, Absolute 0.03 0.00 - 0.20 10*3/mm3    Immature Grans, Absolute 0.02 0.00 - 0.05 10*3/mm3    nRBC 0.0 0.0 - 0.2 /100 WBC   Heparin Anti-Xa    Collection Time: 12/08/24  2:08 PM    Specimen: Blood   Result Value Ref Range    Heparin Anti-Xa (UFH) 0.10 (L) 0.30 - 0.70 IU/ml   Protime-INR    Collection Time: 12/08/24  2:08 PM    Specimen: Blood   Result Value Ref Range    Protime 12.6 12.2 - 14.5 Seconds    INR 0.94 0.89 - 1.12   aPTT    Collection Time: 12/08/24  2:08 PM    Specimen: Blood   Result Value Ref Range    PTT 27.6 (L) 60.0 - 90.0 seconds   Green Top (Gel)    Collection Time: 12/08/24  2:08 PM   Result Value Ref Range    Extra Tube Hold for add-ons.    Lavender Top    Collection Time: 12/08/24  2:08 PM   Result Value Ref Range    Extra Tube hold for add-on    Gold Top - SST    Collection Time: 12/08/24  2:08 PM   Result Value Ref Range    Extra Tube Hold for add-ons.    Gray Top    Collection Time: 12/08/24  2:08 PM   Result Value Ref Range    Extra Tube Hold for add-ons.    Light Blue Top    Collection Time: 12/08/24  2:08 PM   Result Value Ref Range    Extra Tube Hold for add-ons.    ECG 12 Lead ED Triage Standing Order; Chest Pain    Collection Time: 12/08/24  4:19 PM   Result Value Ref Range    QT Interval 398 ms     QTC Interval 467 ms        If labs were ordered, I independently reviewed the results and considered them in treating the patient.      EMERGENCY DEPARTMENT COURSE and DIFFERENTIAL DIAGNOSIS/MDM:   Vitals:  AS OF 16:28 EST    BP - (!) 148/110  HR - 80  TEMP - 97.6 °F (36.4 °C) (Oral)  O2 SATS - 94%      Orders placed during this visit:  Orders Placed This Encounter   Procedures    XR Chest 1 View    [place] Draw    High Sensitivity Troponin T    Comprehensive Metabolic Panel    Lipase    BNP    CBC Auto Differential    High Sensitivity Troponin T 2Hr    Heparin Anti-Xa    Protime-INR    aPTT    Heparin Anti-Xa    NPO Diet NPO Type: Strict NPO    Undress & Gown    Continuous Pulse Oximetry    Notify Provider Platelet Count Less Than 28163    Stop Infusion & Notify Provider if Bleeding Occurs    Oxygen Therapy- Nasal Cannula; Titrate 1-6 LPM Per SpO2; 90 - 95%    ECG 12 Lead ED Triage Standing Order; Chest Pain    ECG 12 Lead ED Triage Standing Order; Chest Pain    Insert Peripheral IV    Initiate Observation Status    CBC & Differential    Green Top (Gel)    Lavender Top    Gold Top - SST    Gray Top    Light Blue Top    CBC & Differential       All labs have been independently reviewed by me.  All radiology studies have been reviewed by me and the radiologist dictating the report.  All EKG's have been independently viewed and interpreted by me.      Discussion below represents my analysis of pertinent findings related to patient's condition, differential diagnosis, treatment plan and final disposition.    Differential diagnosis:  The differential diagnosis associated with the patient's presentation includes: CAD, unstable angina, vasospasm, pleurisy, pericarditis    Additional sources  Discussed/ obtained information from independent historians:   [] Spouse  [] Parent  [] Family member  [] Friend  [] EMS   [] Other:    External (non-ED) record review:   [] Inpatient record:   [] Office record:   [] Outpatient  record:   [] Prior Outpatient labs:   [] Prior Outpatient radiology:   [] Primary Care record:   [] Outside ED record:   [] Other:     Patient's care impacted by:   [x] Diabetes  [x] Hypertension  [] CHF  [x] Hyperlipidemia  [x] Coronary Artery Disease   [] COPD   [] Cancer   [x] Tobacco Abuse   [] Substance Abuse    [] Other:     Care significantly affected by Social Determinants of Health (housing and economic circumstances, unemployment)    [] Yes     [x] No   If yes, Patient's care significantly limited by Social Determinants of Health including:   [] Inadequate housing   [] Low income   [] Alcoholism and drug addiction in family   [] Problems related to primary support group   [] Unemployment   [] Problems related to employment   [] Other Social Determinants of Health:       MEDICATIONS ADMINISTERED IN ED:  Medications   sodium chloride 0.9 % flush 10 mL (has no administration in time range)   heparin 49572 units/250 mL (100 units/mL) in 0.45 % NaCl infusion (10 Units/kg/hr × 99.8 kg Intravenous New Bag 12/8/24 1536)   Pharmacy to Dose Heparin (has no administration in time range)   aspirin chewable tablet 324 mg (324 mg Oral Given 12/8/24 1402)   heparin (porcine) injection 4,000 Units (4,000 Units Intravenous Given 12/8/24 1534)       ED Course as of 12/08/24 1628   Sun Dec 08, 2024   1502 HEART Pathway for Early Discharge in Acute Chest Pain from autoGraph  on 12/8/2024  ** All calculations should be rechecked by clinician prior to use **    RESULT SUMMARY:  5 points  HEART Pathway Score    High risk  12-65% 30-day MACE    Cardiology consultation and admission recommended. Further testing indicated.      INPUTS:  History -> 1 = Moderately suspicious  EKG -> 0 = Normal  Age -> 1 = 45-64  Risk factors -> 2 = >=3 risk factors or history of atherosclerotic disease  Initial troponin -> 1 = 1-3x normal limit   [AP]      ED Course User Index  [AP] Adalberto Hallman,          59-year-old male with chest pain  with a known history of coronary disease and multiple prior stents with intermittent angina which is occurring at rest.  No acute ischemic changes on initial EKG, blood work labs and imaging obtained.  Heart score is 5.  Troponin slightly elevated at 43 with a negative BNP, blood work labs and imaging are otherwise unremarkable.  At this point I do feel given his symptoms, high risk that treatment for NSTEMI would be reasonable which we will proceed forward with heparin, admission to the hospital for troponin trending, cardiology consultation.  Case discussed with hospitalist Dr. Rebolledo for admission.      PROCEDURES:  Procedures    CRITICAL CARE TIME    Total Critical Care time was 0 minutes, excluding separately reportable procedures.   There was a high probability of clinically significant/life threatening deterioration in the patient's condition which required my urgent intervention.      FINAL IMPRESSION      1. NSTEMI (non-ST elevated myocardial infarction)    2. Chest pain, unspecified type          DISPOSITION/PLAN     ED Disposition       ED Disposition   Decision to Admit    Condition   --    Comment   Level of Care: Telemetry [5]   Diagnosis: NSTEMI (non-ST elevated myocardial infarction) [141376]   Admitting Physician: ABDIRAHMAN REBOLLEDO [[id number]]   Attending Physician: ABDIRAHMAN REBOLLEDO [[id number]]   Is patient appropriate for Inpatient Observation Unit?: No [0]               Comment: Please note this report has been produced using speech recognition software.      Adalberto Hallman DO  Attending Emergency Physician         Adalberto Hallman DO  12/08/24 4747

## 2024-12-08 NOTE — PROGRESS NOTES
Pharmacy to Dose Heparin Infusion Note    Adrien Sweet is a 59 y.o. male receiving heparin infusion.     Therapy for (VTE/Cardiac): UA with workup for NSTEMI (Cardiac Protocol)  Patient Weight: 99.8 kg  Initial Bolus (Y/N): Yes  Any Bolus (Y/N): Yes     Signs or Symptoms of Bleeding: No S/Sx bleeding per RN and chart review    Cardiac (Not VTE)   Initial Bolus: 60 units/kg (Max 4,000 units)  Initial rate: 12 units/kg/hr (Max 1,000 units/hr)   Anti-Xa Bolus   Dose Infusion Hold   Time Infusion Rate Change (units/kg/hr) Repeat  Anti-Xa    < 0.11 50 units/kg  (4000 units Max) None Increase by  3 units/kg/hr 6 hours   0.11- 0.19 25 units/kg  (2000 units Max) None Increase by  2 units/kg/hr 6 hours   0.2 - 0.29 0 None Increase by  1 units/kg/hr 6 hours   0.3 - 0.5 0 None No Change 6 hours (after 2 consecutive levels in range check qAM)   0.51 - 0.6 0 None Decrease by  1 units/kg/hr 6 hours   0.61 - 0.8 0 30 minutes Decrease by  2 units/kg/hr 6 hours   0.81 - 1 0 60 minutes Decrease by  3 units/kg/hr 6 hours   >1 0 Hold  After Anti-Xa less than 0.5 decrease previous rate by  4 units/kg/hr  Every 2 hours until Anti-Xa  less than 0.5 then when infusion restarts in 6 hours     Results from last 7 days   Lab Units 12/08/24  1408   INR  0.94   HEMOGLOBIN g/dL 16.8   HEMATOCRIT % 48.6   PLATELETS 10*3/mm3 252       Date   Time   Anti-Xa Current Rate (units/kg/hr) Bolus   (units) Rate Change   (units/kg/hr) New Rate (units/kg/hr) Repeat  Anti-Xa Comments /  Pump Check    12/8 14:08 0.10 ---new-- +4000 +10 10 21:30 Set up IV pump w/ RN Zain. TBW, bolus, rate confirmed. Pt counseled on UFH.                                                                                                                                                                                                                                 Pool Deutsch, PharmD, BCPS, BCCP  15:25 EST   12/08/24

## 2024-12-08 NOTE — H&P
[facility] Medicine Services  HISTORY AND PHYSICAL    Patient Name: Adrien Sweet  : 1965  MRN: [id number]  Primary Care Physician: Hai Harrison MD  Date of admission: 2024      Subjective   Subjective     Chief Complaint:  Chest tightness     HPI:  Adrien Sweet is a 59 y.o. male with PMH of CAD s/p 7 stents, DM, HLD, HTN, fatty liver, tobacco abuse, gout. Patient had an episode of chest tightness about a month ago unprovoked, last night he states he had  40 min episode of chest tightness that he would rate 7/10. He states it spontaneously resolved  for 30 min then cam back for 20 min. He looked for his nitro but could not find. He stated he felt like he was having a heart attack last night but did not go to ED. This am he was concerned and decided to come to ED. He states he has not been checking his BG or bp at home. He has not taken any insulin for about 4 months. He also does not regularly take his statin.  He also started smoking again 5 months ago. He denies any soa, chills, fevers, N/V/D or radiation. Patient was admitted on 3/6/24 for 5 days of chest pain and had a stress test which was normal. Last heart cath was in  and in-stent restenosis of Mid RCA. He had 1 PAT placed.     In ED: trop 43, K 4.3, creat 1.03, lipase 197, WBC8.11      Personal History     Past Medical History:   Diagnosis Date    Diabetes mellitus     Diverticulosis     Fatty liver     Hyperlipidemia     Hypertension     Myocardial infarction            Past Surgical History:   Procedure Laterality Date    CARDIAC CATHETERIZATION      CORONARY STENT PLACEMENT         Family History: family history includes Cancer in his maternal uncle; Diabetes in his maternal grandmother.     Social History:  reports that he has been smoking cigarettes. He does not have any smokeless tobacco history on file. He reports current alcohol use. He reports current drug use. Drug: Marijuana.  Social  History     Social History Narrative    Not on file       Medications:  Available home medication information reviewed.  FLUoxetine, Insulin Lispro, Melatonin-Pyridoxine, Testosterone Enanthate, amLODIPine, aspirin, carvedilol, clopidogrel, colchicine, empagliflozin, fenofibrate, glimepiride, losartan, metFORMIN, ondansetron ODT, rosuvastatin, and valACYclovir    No Known Allergies    Objective   Objective     Vital Signs:   Temp:  [97.6 °F (36.4 °C)] 97.6 °F (36.4 °C)  Heart Rate:  [80-96] 80  Resp:  [18] 18  BP: (148-190)/() 148/110       Physical Exam   Constitutional: Awake, alert  Eyes: PERRLA, sclerae anicteric, no conjunctival injection  HENT: NCAT, mucous membranes moist, face flushed   Neck: Supple, no thyromegaly, no lymphadenopathy, trachea midline  Respiratory: Clear to auscultation bilaterally, nonlabored respirations room air 95%  Cardiovascular: RRR, no murmurs, rubs, or gallops, palpable pedal pulses bilaterally  Gastrointestinal: Positive bowel sounds, soft, nontender, nondistended  Musculoskeletal: No bilateral ankle edema, no clubbing or cyanosis to extremities  Psychiatric: Appropriate affect, cooperative  Neurologic: Oriented x 3, strength symmetric in all extremities, Cranial Nerves grossly intact to confrontation, speech clear  Skin: No rashes      Result Review:  I have personally reviewed the results from the time of this admission to 12/8/2024 16:56 EST and agree with these findings:  [x]  Laboratory list / accordion  []  Microbiology  []  Radiology  []  EKG/Telemetry   []  Cardiology/Vascular   []  Pathology  []  Old records  []  Other:  Most notable findings include:       LAB RESULTS:      Lab 12/08/24  1408   WBC 8.11   HEMOGLOBIN 16.8   HEMATOCRIT 48.6   PLATELETS 252   NEUTROS ABS 5.52   IMMATURE GRANS (ABS) 0.02   LYMPHS ABS 1.98   MONOS ABS 0.40   EOS ABS 0.16   MCV 94.4   PROTIME 12.6   INR 0.94   APTT 27.6*   HEPARIN ANTI-XA 0.10*         Lab 12/08/24  1408   SODIUM 137    POTASSIUM 4.3   CHLORIDE 100   CO2 24.0   ANION GAP 13.0   BUN 24*   CREATININE 1.03   EGFR 84.7   GLUCOSE 284*   CALCIUM 9.4         Lab 12/08/24  1408   TOTAL PROTEIN 6.7   ALBUMIN 4.1   GLOBULIN 2.6   ALT (SGPT) 47*   AST (SGOT) 37   BILIRUBIN 0.3   ALK PHOS 64   LIPASE 197*         Lab 12/08/24  1612 12/08/24  1408   PROBNP  --  314.7   HSTROP T 42* 43*                 UA          9/7/2024    13:26 10/15/2024    12:01   Urinalysis   Squamous Epithelial Cells, UA 0-2     Specific Gravity, UA 1.035  1.02       Ketones, UA Negative     Blood, UA Negative     Leukocytes, UA Negative     Nitrite, UA Negative  Negative       RBC, UA 0-2     WBC, UA 0-2     Bacteria, UA None Seen        Details          This result is from an external source.               Microbiology Results (last 10 days)       ** No results found for the last 240 hours. **            XR Chest 1 View    Result Date: 12/8/2024  XR CHEST 1 VW Date of Exam: 12/8/2024 2:18 PM EST Indication: Chest Pain Triage Protocol Comparison: None available. Findings: Lungs are well expanded and appear clear. No pneumothorax or large pleural effusion is seen. Cardiomediastinal contours appear within normal limits.     Impression: Impression: No acute cardiopulmonary abnormality is identified. Electronically Signed: Alesha Wheeler  12/8/2024 2:56 PM EST  Workstation ID: [id number]         Assessment & Plan   Assessment & Plan       NSTEMI (non-ST elevated myocardial infarction)    Type 2 diabetes mellitus with circulatory disorder, with long-term current use of insulin    HTN (hypertension)    HLD (hyperlipidemia)    Tobacco use    CAD (coronary artery disease)    NSTEMI  HX of CAD s/p 7 stents  HLD  HTN  -- consult cardiology in am  -- heparin drip  -- cont asa, plavix, statin, coreg, norvasc, cozaar from home meds   -- npo after midnight   -- FLP in am     DM   -- A1c 7.7% 7/2024  -- recheck A1c in am   -- diabetes educator  -- hold oral insulin  -- cont jardiance    -- MDSSI     Elevated Lipase  -- no abd pain, N/V  -- monitor     Tobacco use   HX of alcohol use       VTE Prophylaxis:  Pharmacologic VTE prophylaxis orders are present.          CODE STATUS:    Code Status and Medical Interventions: CPR (Attempt to Resuscitate); Full Support   Ordered at: 12/08/24 1645     Level Of Support Discussed With:    Patient     Code Status (Patient has no pulse and is not breathing):    CPR (Attempt to Resuscitate)     Medical Interventions (Patient has pulse or is breathing):    Full Support       Expected Discharge   Expected Discharge Date: 12/10/2024; Expected Discharge Time:      Shameka Orourke, ERASMO  12/08/24

## 2024-12-09 LAB
ANION GAP SERPL CALCULATED.3IONS-SCNC: 11 MMOL/L (ref 5–15)
BASOPHILS # BLD AUTO: 0.02 10*3/MM3 (ref 0–0.2)
BASOPHILS NFR BLD AUTO: 0.3 % (ref 0–1.5)
BUN SERPL-MCNC: 18 MG/DL (ref 6–20)
BUN/CREAT SERPL: 22.8 (ref 7–25)
CALCIUM SPEC-SCNC: 9 MG/DL (ref 8.6–10.5)
CHLORIDE SERPL-SCNC: 100 MMOL/L (ref 98–107)
CHOLEST SERPL-MCNC: 200 MG/DL (ref 0–200)
CO2 SERPL-SCNC: 25 MMOL/L (ref 22–29)
CREAT SERPL-MCNC: 0.79 MG/DL (ref 0.76–1.27)
DEPRECATED RDW RBC AUTO: 45.3 FL (ref 37–54)
EGFRCR SERPLBLD CKD-EPI 2021: 103.6 ML/MIN/1.73
EOSINOPHIL # BLD AUTO: 0.27 10*3/MM3 (ref 0–0.4)
EOSINOPHIL NFR BLD AUTO: 4.4 % (ref 0.3–6.2)
ERYTHROCYTE [DISTWIDTH] IN BLOOD BY AUTOMATED COUNT: 12.8 % (ref 12.3–15.4)
GLUCOSE BLDC GLUCOMTR-MCNC: 172 MG/DL (ref 70–130)
GLUCOSE BLDC GLUCOMTR-MCNC: 196 MG/DL (ref 70–130)
GLUCOSE BLDC GLUCOMTR-MCNC: 222 MG/DL (ref 70–130)
GLUCOSE BLDC GLUCOMTR-MCNC: 233 MG/DL (ref 70–130)
GLUCOSE SERPL-MCNC: 187 MG/DL (ref 65–99)
HBA1C MFR BLD: 8.3 % (ref 4.8–5.6)
HCT VFR BLD AUTO: 45.5 % (ref 37.5–51)
HDLC SERPL-MCNC: 30 MG/DL (ref 40–60)
HGB BLD-MCNC: 15.4 G/DL (ref 13–17.7)
IMM GRANULOCYTES # BLD AUTO: 0.04 10*3/MM3 (ref 0–0.05)
IMM GRANULOCYTES NFR BLD AUTO: 0.7 % (ref 0–0.5)
LDLC SERPL CALC-MCNC: 87 MG/DL (ref 0–100)
LDLC/HDLC SERPL: 2.26 {RATIO}
LYMPHOCYTES # BLD AUTO: 2.51 10*3/MM3 (ref 0.7–3.1)
LYMPHOCYTES NFR BLD AUTO: 40.9 % (ref 19.6–45.3)
MCH RBC QN AUTO: 32.3 PG (ref 26.6–33)
MCHC RBC AUTO-ENTMCNC: 33.8 G/DL (ref 31.5–35.7)
MCV RBC AUTO: 95.4 FL (ref 79–97)
MONOCYTES # BLD AUTO: 0.39 10*3/MM3 (ref 0.1–0.9)
MONOCYTES NFR BLD AUTO: 6.4 % (ref 5–12)
NEUTROPHILS NFR BLD AUTO: 2.91 10*3/MM3 (ref 1.7–7)
NEUTROPHILS NFR BLD AUTO: 47.3 % (ref 42.7–76)
NRBC BLD AUTO-RTO: 0 /100 WBC (ref 0–0.2)
PLATELET # BLD AUTO: 221 10*3/MM3 (ref 140–450)
PMV BLD AUTO: 10.3 FL (ref 6–12)
POTASSIUM SERPL-SCNC: 4.1 MMOL/L (ref 3.5–5.2)
QT INTERVAL: 400 MS
QT INTERVAL: 426 MS
QTC INTERVAL: 462 MS
QTC INTERVAL: 467 MS
RBC # BLD AUTO: 4.77 10*6/MM3 (ref 4.14–5.8)
SODIUM SERPL-SCNC: 136 MMOL/L (ref 136–145)
TRIGL SERPL-MCNC: 511 MG/DL (ref 0–150)
UFH PPP CHRO-ACNC: 0.15 IU/ML (ref 0.3–0.7)
UFH PPP CHRO-ACNC: >1.1 IU/ML (ref 0.3–0.7)
VLDLC SERPL-MCNC: 83 MG/DL (ref 5–40)
WBC NRBC COR # BLD AUTO: 6.14 10*3/MM3 (ref 3.4–10.8)

## 2024-12-09 PROCEDURE — B2111ZZ FLUOROSCOPY OF MULTIPLE CORONARY ARTERIES USING LOW OSMOLAR CONTRAST: ICD-10-PCS | Performed by: INTERNAL MEDICINE

## 2024-12-09 PROCEDURE — 85025 COMPLETE CBC W/AUTO DIFF WBC: CPT | Performed by: EMERGENCY MEDICINE

## 2024-12-09 PROCEDURE — C1725 CATH, TRANSLUMIN NON-LASER: HCPCS | Performed by: INTERNAL MEDICINE

## 2024-12-09 PROCEDURE — 93010 ELECTROCARDIOGRAM REPORT: CPT | Performed by: INTERNAL MEDICINE

## 2024-12-09 PROCEDURE — 25010000002 MIDAZOLAM PER 1 MG: Performed by: INTERNAL MEDICINE

## 2024-12-09 PROCEDURE — 63710000001 INSULIN LISPRO (HUMAN) PER 5 UNITS: Performed by: NURSE PRACTITIONER

## 2024-12-09 PROCEDURE — C1769 GUIDE WIRE: HCPCS | Performed by: INTERNAL MEDICINE

## 2024-12-09 PROCEDURE — C1887 CATHETER, GUIDING: HCPCS | Performed by: INTERNAL MEDICINE

## 2024-12-09 PROCEDURE — 027035Z DILATION OF CORONARY ARTERY, ONE ARTERY WITH TWO DRUG-ELUTING INTRALUMINAL DEVICES, PERCUTANEOUS APPROACH: ICD-10-PCS | Performed by: INTERNAL MEDICINE

## 2024-12-09 PROCEDURE — 4A023N7 MEASUREMENT OF CARDIAC SAMPLING AND PRESSURE, LEFT HEART, PERCUTANEOUS APPROACH: ICD-10-PCS | Performed by: INTERNAL MEDICINE

## 2024-12-09 PROCEDURE — 99232 SBSQ HOSP IP/OBS MODERATE 35: CPT | Performed by: FAMILY MEDICINE

## 2024-12-09 PROCEDURE — 85520 HEPARIN ASSAY: CPT

## 2024-12-09 PROCEDURE — 80048 BASIC METABOLIC PNL TOTAL CA: CPT | Performed by: NURSE PRACTITIONER

## 2024-12-09 PROCEDURE — 25010000002 HEPARIN (PORCINE) PER 1000 UNITS: Performed by: INTERNAL MEDICINE

## 2024-12-09 PROCEDURE — 83036 HEMOGLOBIN GLYCOSYLATED A1C: CPT | Performed by: NURSE PRACTITIONER

## 2024-12-09 PROCEDURE — 25810000003 SODIUM CHLORIDE 0.9 % SOLUTION: Performed by: INTERNAL MEDICINE

## 2024-12-09 PROCEDURE — C1874 STENT, COATED/COV W/DEL SYS: HCPCS | Performed by: INTERNAL MEDICINE

## 2024-12-09 PROCEDURE — 25010000002 LIDOCAINE PF 1% 1 % SOLUTION: Performed by: INTERNAL MEDICINE

## 2024-12-09 PROCEDURE — 63710000001 INSULIN LISPRO (HUMAN) PER 5 UNITS: Performed by: INTERNAL MEDICINE

## 2024-12-09 PROCEDURE — 99254 IP/OBS CNSLTJ NEW/EST MOD 60: CPT | Performed by: INTERNAL MEDICINE

## 2024-12-09 PROCEDURE — C1894 INTRO/SHEATH, NON-LASER: HCPCS | Performed by: INTERNAL MEDICINE

## 2024-12-09 PROCEDURE — 25510000001 IOPAMIDOL PER 1 ML: Performed by: INTERNAL MEDICINE

## 2024-12-09 PROCEDURE — 93458 L HRT ARTERY/VENTRICLE ANGIO: CPT | Performed by: INTERNAL MEDICINE

## 2024-12-09 PROCEDURE — 82948 REAGENT STRIP/BLOOD GLUCOSE: CPT

## 2024-12-09 PROCEDURE — 80061 LIPID PANEL: CPT | Performed by: NURSE PRACTITIONER

## 2024-12-09 PROCEDURE — 93005 ELECTROCARDIOGRAM TRACING: CPT | Performed by: NURSE PRACTITIONER

## 2024-12-09 PROCEDURE — 85520 HEPARIN ASSAY: CPT | Performed by: INTERNAL MEDICINE

## 2024-12-09 PROCEDURE — 93005 ELECTROCARDIOGRAM TRACING: CPT | Performed by: INTERNAL MEDICINE

## 2024-12-09 PROCEDURE — 25010000002 HEPARIN (PORCINE) PER 1000 UNITS: Performed by: FAMILY MEDICINE

## 2024-12-09 PROCEDURE — B2151ZZ FLUOROSCOPY OF LEFT HEART USING LOW OSMOLAR CONTRAST: ICD-10-PCS | Performed by: INTERNAL MEDICINE

## 2024-12-09 PROCEDURE — 85347 COAGULATION TIME ACTIVATED: CPT

## 2024-12-09 PROCEDURE — 92928 PRQ TCAT PLMT NTRAC ST 1 LES: CPT | Performed by: INTERNAL MEDICINE

## 2024-12-09 PROCEDURE — 25010000002 HEPARIN (PORCINE) 25000-0.45 UT/250ML-% SOLUTION: Performed by: FAMILY MEDICINE

## 2024-12-09 PROCEDURE — C9600 PERC DRUG-EL COR STENT SING: HCPCS | Performed by: INTERNAL MEDICINE

## 2024-12-09 DEVICE — XIENCE SKYPOINT™ EVEROLIMUS ELUTING CORONARY STENT SYSTEM 2.50 MM X 23 MM / RAPID-EXCHANGE
Type: IMPLANTABLE DEVICE | Site: CORONARY | Status: FUNCTIONAL
Brand: XIENCE SKYPOINT™

## 2024-12-09 DEVICE — XIENCE SKYPOINT™ EVEROLIMUS ELUTING CORONARY STENT SYSTEM 3.50 MM X 23 MM / RAPID-EXCHANGE
Type: IMPLANTABLE DEVICE | Site: CORONARY | Status: FUNCTIONAL
Brand: XIENCE SKYPOINT™

## 2024-12-09 RX ORDER — IOPAMIDOL 755 MG/ML
INJECTION, SOLUTION INTRAVASCULAR
Status: DISCONTINUED | OUTPATIENT
Start: 2024-12-09 | End: 2024-12-09 | Stop reason: HOSPADM

## 2024-12-09 RX ORDER — MIDAZOLAM HYDROCHLORIDE 1 MG/ML
INJECTION, SOLUTION INTRAMUSCULAR; INTRAVENOUS
Status: DISCONTINUED | OUTPATIENT
Start: 2024-12-09 | End: 2024-12-09 | Stop reason: HOSPADM

## 2024-12-09 RX ORDER — NICOTINE 21 MG/24HR
1 PATCH, TRANSDERMAL 24 HOURS TRANSDERMAL
Status: DISCONTINUED | OUTPATIENT
Start: 2024-12-09 | End: 2024-12-10 | Stop reason: HOSPADM

## 2024-12-09 RX ORDER — CARVEDILOL 12.5 MG/1
12.5 TABLET ORAL 2 TIMES DAILY WITH MEALS
Status: DISCONTINUED | OUTPATIENT
Start: 2024-12-09 | End: 2024-12-10

## 2024-12-09 RX ORDER — ACETAMINOPHEN 325 MG/1
650 TABLET ORAL EVERY 4 HOURS PRN
Status: DISCONTINUED | OUTPATIENT
Start: 2024-12-09 | End: 2024-12-09 | Stop reason: SDUPTHER

## 2024-12-09 RX ORDER — CLOPIDOGREL BISULFATE 75 MG/1
TABLET ORAL
Status: DISCONTINUED | OUTPATIENT
Start: 2024-12-09 | End: 2024-12-09 | Stop reason: HOSPADM

## 2024-12-09 RX ORDER — HEPARIN SODIUM 1000 [USP'U]/ML
INJECTION, SOLUTION INTRAVENOUS; SUBCUTANEOUS
Status: DISCONTINUED | OUTPATIENT
Start: 2024-12-09 | End: 2024-12-09 | Stop reason: HOSPADM

## 2024-12-09 RX ORDER — SODIUM CHLORIDE 9 MG/ML
100 INJECTION, SOLUTION INTRAVENOUS CONTINUOUS
Status: ACTIVE | OUTPATIENT
Start: 2024-12-09 | End: 2024-12-09

## 2024-12-09 RX ORDER — LIDOCAINE HYDROCHLORIDE 10 MG/ML
INJECTION, SOLUTION EPIDURAL; INFILTRATION; INTRACAUDAL; PERINEURAL
Status: DISCONTINUED | OUTPATIENT
Start: 2024-12-09 | End: 2024-12-09 | Stop reason: HOSPADM

## 2024-12-09 RX ORDER — HEPARIN SODIUM 1000 [USP'U]/ML
2000 INJECTION, SOLUTION INTRAVENOUS; SUBCUTANEOUS ONCE
Status: COMPLETED | OUTPATIENT
Start: 2024-12-09 | End: 2024-12-09

## 2024-12-09 RX ADMIN — ROSUVASTATIN 40 MG: 20 TABLET, FILM COATED ORAL at 08:50

## 2024-12-09 RX ADMIN — INSULIN LISPRO 4 UNITS: 100 INJECTION, SOLUTION INTRAVENOUS; SUBCUTANEOUS at 22:41

## 2024-12-09 RX ADMIN — SODIUM CHLORIDE 100 ML/HR: 9 INJECTION, SOLUTION INTRAVENOUS at 16:09

## 2024-12-09 RX ADMIN — CARVEDILOL 6.25 MG: 6.25 TABLET, FILM COATED ORAL at 08:51

## 2024-12-09 RX ADMIN — CARVEDILOL 12.5 MG: 12.5 TABLET, FILM COATED ORAL at 17:06

## 2024-12-09 RX ADMIN — FLUOXETINE HYDROCHLORIDE 20 MG: 20 CAPSULE ORAL at 08:50

## 2024-12-09 RX ADMIN — HEPARIN SODIUM 2000 UNITS: 1000 INJECTION, SOLUTION INTRAVENOUS; SUBCUTANEOUS at 08:46

## 2024-12-09 RX ADMIN — CLOPIDOGREL BISULFATE 75 MG: 75 TABLET ORAL at 08:51

## 2024-12-09 RX ADMIN — LOSARTAN POTASSIUM 100 MG: 50 TABLET, FILM COATED ORAL at 08:50

## 2024-12-09 RX ADMIN — INSULIN LISPRO 2 UNITS: 100 INJECTION, SOLUTION INTRAVENOUS; SUBCUTANEOUS at 12:37

## 2024-12-09 RX ADMIN — NICOTINE 1 PATCH: 21 PATCH TRANSDERMAL at 17:55

## 2024-12-09 RX ADMIN — Medication 10 ML: at 08:52

## 2024-12-09 RX ADMIN — AMLODIPINE BESYLATE 10 MG: 5 TABLET ORAL at 08:50

## 2024-12-09 RX ADMIN — HEPARIN SODIUM 15 UNITS/KG/HR: 10000 INJECTION, SOLUTION INTRAVENOUS at 08:44

## 2024-12-09 RX ADMIN — EMPAGLIFLOZIN 10 MG: 10 TABLET, FILM COATED ORAL at 08:51

## 2024-12-09 RX ADMIN — Medication 10 ML: at 22:42

## 2024-12-09 RX ADMIN — INSULIN LISPRO 2 UNITS: 100 INJECTION, SOLUTION INTRAVENOUS; SUBCUTANEOUS at 08:51

## 2024-12-09 RX ADMIN — ASPIRIN 81 MG: 81 TABLET, COATED ORAL at 08:50

## 2024-12-09 RX ADMIN — INSULIN LISPRO 4 UNITS: 100 INJECTION, SOLUTION INTRAVENOUS; SUBCUTANEOUS at 17:07

## 2024-12-09 NOTE — ED NOTES
Adrien Sweet    Nursing Report ED to Floor:  Mental status: AOX4  Ambulatory status: INDEPENDENT  Oxygen Therapy:  RA  Cardiac Rhythm: NSR  Admitted from: HOME  Safety Concerns:  NA  Social Issues: NA  ED Room #:  11    ED Nurse Phone Extension - [phone] or may call [phone].      HPI:   Chief Complaint   Patient presents with    Chest Pain       Past Medical History:  Past Medical History:   Diagnosis Date    Diabetes mellitus     Diverticulosis     Fatty liver     Hyperlipidemia     Hypertension     Myocardial infarction         Past Surgical History:  Past Surgical History:   Procedure Laterality Date    CARDIAC CATHETERIZATION      CORONARY STENT PLACEMENT          Admitting Doctor:   Sue Orourke MD    Consulting Provider(s):  Consults       No orders found from 11/9/2024 to 12/9/2024.             Admitting Diagnosis:   The primary encounter diagnosis was NSTEMI (non-ST elevated myocardial infarction). A diagnosis of Chest pain, unspecified type was also pertinent to this visit.    Most Recent Vitals:   Vitals:    12/08/24 1844 12/08/24 1846 12/08/24 1900 12/08/24 1956   BP:   149/94    BP Location:       Patient Position:       Pulse: 89 88  88   Resp:       Temp:       TempSrc:       SpO2: 95% 95%  96%   Weight:       Height:           Active LDAs/IV Access:   Lines, Drains & Airways       Active LDAs       Name Placement date Placement time Site Days    Peripheral IV 12/08/24 1410 Anterior;Distal;Left Forearm 12/08/24  1410  Forearm  less than 1                    Labs (abnormal labs have a star):   Labs Reviewed   TROPONIN - Abnormal; Notable for the following components:       Result Value    HS Troponin T 43 (*)     All other components within normal limits    Narrative:     High Sensitive Troponin T Reference Range:  <14.0 ng/L- Negative Female for AMI  <22.0 ng/L- Negative Male for AMI  >=14 - Abnormal Female indicating possible myocardial injury.  >=22 - Abnormal Male indicating possible myocardial  injury.   Clinicians would have to utilize clinical acumen, EKG, Troponin, and serial changes to determine if it is an Acute Myocardial Infarction or myocardial injury due to an underlying chronic condition.        COMPREHENSIVE METABOLIC PANEL - Abnormal; Notable for the following components:    Glucose 284 (*)     BUN 24 (*)     ALT (SGPT) 47 (*)     All other components within normal limits    Narrative:     GFR Normal >60  Chronic Kidney Disease <60  Kidney Failure <15     LIPASE - Abnormal; Notable for the following components:    Lipase 197 (*)     All other components within normal limits   CBC WITH AUTO DIFFERENTIAL - Abnormal; Notable for the following components:    Monocyte % 4.9 (*)     All other components within normal limits   HIGH SENSITIVITIY TROPONIN T 2HR - Abnormal; Notable for the following components:    HS Troponin T 42 (*)     All other components within normal limits    Narrative:     High Sensitive Troponin T Reference Range:  <14.0 ng/L- Negative Female for AMI  <22.0 ng/L- Negative Male for AMI  >=14 - Abnormal Female indicating possible myocardial injury.  >=22 - Abnormal Male indicating possible myocardial injury.   Clinicians would have to utilize clinical acumen, EKG, Troponin, and serial changes to determine if it is an Acute Myocardial Infarction or myocardial injury due to an underlying chronic condition.        HEPARIN ANTI XA - Abnormal; Notable for the following components:    Heparin Anti-Xa (UFH) 0.10 (*)     All other components within normal limits   APTT - Abnormal; Notable for the following components:    PTT 27.6 (*)     All other components within normal limits    Narrative:     PTT = The equivalent PTT values for the therapeutic range of heparin levels at 0.3 to 0.5 U/ml are 60 to 70 seconds.   BNP (IN-HOUSE) - Normal    Narrative:     This assay is used as an aid in the diagnosis of individuals suspected of having heart failure. It can be used as an aid in the  diagnosis of acute decompensated heart failure (ADHF) in patients presenting with signs and symptoms of ADHF to the emergency department (ED). In addition, NT-proBNP of <300 pg/mL indicates ADHF is not likely.    Age Range Result Interpretation  NT-proBNP Concentration (pg/mL:      <50             Positive            >450                   Gray                 300-450                    Negative             <300    50-75           Positive            >900                  Gray                300-900                  Negative            <300      >75             Positive            >1800                  Gray                300-1800                  Negative            <300   PROTIME-INR - Normal   RAINBOW DRAW    Narrative:     The following orders were created for panel order [place] Draw.  Procedure                               Abnormality         Status                     ---------                               -----------         ------                     Green Top (Gel)[[id number]]                                  Final result               Lavender Top[[id number]]                                     Final result               Gold Top - SST[[id number]]                                   Final result               Gray Top[[id number]]                                         Final result               Light Blue Top[[id number]]                                   Final result                 Please view results for these tests on the individual orders.   HEPARIN ANTI XA   CBC AND DIFFERENTIAL    Narrative:     The following orders were created for panel order CBC & Differential.  Procedure                               Abnormality         Status                     ---------                               -----------         ------                     CBC Auto Differential[[id number]]        Abnormal            Final result                 Please view results for these tests on the individual orders.   GREEN TOP    LAVENDER TOP   GOLD TOP - SST   GRAY TOP   LIGHT BLUE TOP       Meds Given in ED:   Medications   sodium chloride 0.9 % flush 10 mL (has no administration in time range)   heparin 89086 units/250 mL (100 units/mL) in 0.45 % NaCl infusion (10 Units/kg/hr × 99.8 kg Intravenous New Bag 12/8/24 1536)   Pharmacy to Dose Heparin (has no administration in time range)   aspirin chewable tablet 324 mg (324 mg Oral Given 12/8/24 1402)   heparin (porcine) injection 4,000 Units (4,000 Units Intravenous Given 12/8/24 1534)     heparin, 10 Units/kg/hr, Last Rate: 10 Units/kg/hr (12/08/24 1536)  Pharmacy to Dose Heparin,          Last NIH score:                                                          Dysphagia screening results:  Patient Factors Component (Dysphagia:Stroke or Rule-out)  Best Eye Response: 4-->(E4) spontaneous (12/08/24 1443)  Best Motor Response: 6-->(M6) obeys commands (12/08/24 1443)  Best Verbal Response: 5-->(V5) oriented (12/08/24 1443)  [place] Coma Scale Score: 15 (12/08/24 1443)     [place] Coma Scale:  No data recorded     CIWA:        Restraint Type:            Isolation Status:  No active isolations

## 2024-12-09 NOTE — PROGRESS NOTES
Referral received for Phase II Cardiac Rehab. Staff will continue to follow this patient to determine eligibility for Phase II Cardiac Rehab program.

## 2024-12-09 NOTE — CONSULTS
"Diabetes Education  Assessment/Teaching    Patient Name:  Adrien Sweet  YOB: 1965  MRN: [id number]  Admit Date:  12/8/2024      Assessment Date:  12/9/2024  Flowsheet Row Most Recent Value   General Information     Referral From: A1c   Height 177.8 cm (70\")   Height Method Stated   Weight 101 kg (222 lb 7.1 oz)   Weight Method Bed scale   Is patient pregnant? n/a   Pregnancy Assessment    Diabetes History    What type of diabetes do you have? Type 2   Length of Diabetes Diagnosis 6 - 10 years   Current DM knowledge fair   Do you test your blood sugar at home? yes   Frequency of checks 2-3 times per week   Who performs the test? self   Typical readings Patient is unsure   Feeling down, depressed, or hopeless Not at all   Little interest or pleasure in doing things Not at all   Education Preferences    What areas of diabetes would you like to learn about? avoiding high blood sugar, avoiding low blood sugar, diabetes complications, diet information, exercise information, medications for diabetes, resources on diabetes, testing my blood sugar at home, understanding diabetes   Nutrition Information    Assessment Topics    Healthy Eating - Assessment Needs education   Being Active - Assessment Needs education   Taking Medication - Assessment Competent   Problem Solving - Assessment Needs education   Reducing Risk - Assessment Needs education   Healthy Coping - Assessment Needs education   Monitoring - Assessment Needs education   DM Goals             Flowsheet Row Most Recent Value   DM Education Needs    Meter Has own   Frequency of Testing 2 times a week   Blood Glucose Target Range Target ranges per ADA guidelines   Medication Oral, Insulin, Actions, Side effects, Administration   Problem Solving Hypoglycemia, Hyperglycemia, Sick days, Signs, Symptoms, Treatment   Reducing Risks A1C testing, Lipids, Blood pressure   Healthy Eating Reviewed meal plan   Physical Activity Frequency Discussed exercise " "importance   Healthy Coping Appropriate   Motivation Engaged   Teaching Method Explanation, Discussion, Handouts   Patient Response Needs reinforcement          Total time spent reviewing chart, preparing education/materials, providing education at bedside, and coordinating care approx 30 minutes.    Consult for diabetes education received per A1c >7.0. Chart reviewed. Mr. Sweet was seen at bedside today. Permission given for visit.     Discussed and reviewed concepts about type 2 diabetes self-management, risk factors, and importance of blood glucose control to reduce complications. Target blood glucose readings and A1c goals per ADA were reviewed. Reviewed with patient current A1c 8.3 and discussed its significance. Signs, symptoms, and treatment of hyperglycemia and hypoglycemia were discussed. Patient states that he has not been checking his blood glucose levels regularly like he used to. Previously he would check 2-3 times per week.    Patient's home medications include: jardiance, amaryl, and humalog. Patient denies any issues acquiring or administering his medications at this time.    Discussed plate method for making balanced food choices. Patient states that he is a  and eats small amounts of food throughout the day while at work. Discussed combining protein and carb choices to help balance blood glucose levels. Patient states that he drinks mainly water and 1 cup of coffee in the morning.    Discussed importance of physical activity.    Lifestyle changes such as physical activity with MD approval and healthy eating were encouraged. Encouraged pt to monitor blood sugar at home 2-3  times per day and to call PCP if blood sugar is trending high. Encouraged to keep record of blood glucose readings to take to follow up appointment with PCP. Provided patient with copy of [employer]'s \"What is Diabetes\" handout, \"Blood Glucose Goals\" handout, \"What is A1c\" handout, and [employer]'s \"A Balanced Plate\" " handout.     Thank you for this consult.           Electronically signed by:  Priyanka Gipson RN  12/09/24 11:23 EST

## 2024-12-09 NOTE — CONSULTS
Date of Hospital Visit: 24  Encounter Provider: Greta Hearn MD  Place of Service: [facility] [place] [facility]  Patient Name: Adrien Sweet  :1965  Referral Provider: No ref. provider found  Primary Care Provider: Hai Harrison MD    Chief complaint: Chest pain/non-STEMI    History of Present Illness:  The patient is a 59-year-old male with history of hypertension, diabetes, dyslipidemia and coronary disease.  His CAD first manifested in  with an acute MI and he subsequently underwent stenting of the LAD and RCA.  Since then the patient has been fairly stable.  He is a former resident of [facility] and moved to [place] about 4 months ago.  Has not established care with cardiology locally yet.  He is a  who is currently between jobs.  States that at work he gets about 5 to 6 miles of walking daily but does not have any structured exercise program.  After a prolonged period of cardiac stability the patient developed anterior chest tightness and pressure on Saturday night which reminded him of the chest pain that he is experienced at the time of his MI.  States that the symptoms were milder and lasted about 40 minutes then he had another episode that lasted 20 minutes and then resolved completely.  He became concerned and came to the emergency department yesterday where his workup revealed elevated cardiac markers consistent with non-STEMI.  He was admitted for further observation.  Since admission he has remained free of chest pain.  No chest pain or shortness of breath at this time.  No edema orthopnea PND palpitations dizziness lightheadedness or syncope.  No symptoms of stroke.  All other review of systems are negative.    Patient is a former smoker and quit for 10 years ago and then started smoking again about 5 to 6 months ago.  He drinks alcohol 2-3 times a week.  Denies illicit substance abuse.    He is never  and does not have any children.  No family  history of premature CAD.    Past Medical History:   Diagnosis Date    Diabetes mellitus     Diverticulosis     Fatty liver     Hyperlipidemia     Hypertension     Myocardial infarction        Past Surgical History:   Procedure Laterality Date    CARDIAC CATHETERIZATION      CORONARY STENT PLACEMENT         Medications Prior to Admission   Medication Sig Dispense Refill Last Dose/Taking    amLODIPine (NORVASC) 10 MG tablet Take 1 tablet by mouth Daily.       aspirin 81 MG EC tablet Take 1 tablet by mouth Daily.       carvedilol (COREG) 6.25 MG tablet Take 1 tablet by mouth 2 (Two) Times a Day With Meals.       clopidogrel (PLAVIX) 75 MG tablet Take 1 tablet by mouth Daily.       colchicine 0.6 MG tablet Take 1 tablet by mouth Daily As Needed.       empagliflozin (Jardiance) 10 MG tablet tablet Take 1 tablet by mouth Daily.       fenofibrate (TRICOR) 145 MG tablet Take 1 tablet by mouth Daily.       FLUoxetine (PROzac) 20 MG capsule Take 1 capsule by mouth Daily.       glimepiride (AMARYL) 4 MG tablet Take 1 tablet by mouth 2 (Two) Times a Day.       Insulin Lispro (humaLOG) 100 UNIT/ML injection Inject 5 Units under the skin into the appropriate area as directed As Needed for High Blood Sugar. 5 UNITS FOR EVERY 50 BLOOD SUGAR OVER 200. UP TO 50 UNITS DAILY.       losartan (COZAAR) 100 MG tablet Take 1 tablet by mouth Daily.       Melatonin-Pyridoxine (MELATONEX PO) Take 3 mg by mouth every night at bedtime.       metFORMIN (GLUCOPHAGE) 500 MG tablet Take 1 tablet by mouth 2 (Two) Times a Day With Meals. 4 TABLETS AT BREAKFAST       ondansetron ODT (ZOFRAN-ODT) 4 MG disintegrating tablet Place 1 tablet on the tongue 4 (Four) Times a Day As Needed for Nausea. 16 tablet 0     rosuvastatin (CRESTOR) 40 MG tablet Take 1 tablet by mouth Daily.       Testosterone Enanthate (Xyosted) 75 MG/0.5ML solution auto-injector Inject 75 mg under the skin into the appropriate area as directed 1 (One) Time Per Week.       valACYclovir  "(VALTREX) 500 MG tablet Take 2 tablets by mouth 3 (Three) Times a Day.          Social History     Socioeconomic History    Marital status: Single   Tobacco Use    Smoking status: Every Day     Types: Cigarettes   Vaping Use    Vaping status: Never Used   Substance and Sexual Activity    Alcohol use: Yes     Comment: 2X WEEKLY    Drug use: Yes     Types: Marijuana     Comment: OCCASIONAL       Family History   Problem Relation Age of Onset    Cancer Maternal Uncle     Diabetes Maternal Grandmother        REVIEW OF SYSTEMS:     12 point ROS was performed and is Negative except as outlined in HPI     Objective:     Vitals:    12/08/24 2148 12/08/24 2345 12/09/24 0441 12/09/24 0700   BP: (!) 187/115 150/89 (!) 162/104 (!) 161/101   BP Location: Right arm Left arm Right arm Left arm   Patient Position: Lying Lying Lying Lying   Pulse: 79 76 74 91   Resp:  16 16 18   Temp:  98.7 °F (37.1 °C) 97.6 °F (36.4 °C) 98 °F (36.7 °C)   TempSrc:  Oral Oral Oral   SpO2:  94%  96%   Weight:       Height:         Body mass index is 31.92 kg/m².  Flowsheet Rows      Flowsheet Row First Filed Value   Admission Height 177.8 cm (70\") Documented at 12/08/2024 1322   Admission Weight 99.8 kg (220 lb) Documented at 12/08/2024 1322            Physical Exam   General: No acute distress, obese.  Skin: Skin is warm and dry. No obvious cyanosis, erythema or pallor.   HEENT: Atraumatic, normocephalic, no conjunctival pallor, no scleral icterus.   Neck: Supple, no JVD. Normal carotid upstrokes, no bruits.    Chest:No respiratory distress. No chest wall tenderness. Breath sounds are normal. No wheezes, rhonchi or rales.  Cardiovascular: Normal S1 and S2, no murmur, gallop or rub. PMI is not displaced.    Pulses:Radial and pedal pulses are 2+ and symmetric.    Abdomen: Soft, nontender, normal bowel sounds.   Musculoskeletal/Extremities:  No clubbing, cyanosis or edema. No gross deformity.   Neurological: Alert and oriented to person, place, and " time, no gross focal deficits.   Psychiatric: Normal mood and affect.Speech and behavior is normal.    Lab Review:                Results from last 7 days   Lab Units 12/08/24  1408   SODIUM mmol/L 137   POTASSIUM mmol/L 4.3   CHLORIDE mmol/L 100   CO2 mmol/L 24.0   BUN mg/dL 24*   CREATININE mg/dL 1.03   GLUCOSE mg/dL 284*   CALCIUM mg/dL 9.4     Results from last 7 days   Lab Units 12/08/24  1612 12/08/24  1408   HSTROP T ng/L 42* 43*     Results from last 7 days   Lab Units 12/09/24  0556   WBC 10*3/mm3 6.14   HEMOGLOBIN g/dL 15.4   HEMATOCRIT % 45.5   PLATELETS 10*3/mm3 221     Results from last 7 days   Lab Units 12/08/24  1408   INR  0.94   APTT seconds 27.6*             @LABRCNT(bnp)@  Imaging Results (Last 24 Hours)       Procedure Component Value Units Date/Time    XR Chest 1 View [[id number]] Collected: 12/08/24 1455     Updated: 12/08/24 1459    Narrative:      XR CHEST 1 VW    Date of Exam: 12/8/2024 2:18 PM EST    Indication: Chest Pain Triage Protocol    Comparison: None available.    Findings:  Lungs are well expanded and appear clear. No pneumothorax or large pleural effusion is seen. Cardiomediastinal contours appear within normal limits.      Impression:      Impression:  No acute cardiopulmonary abnormality is identified.        Electronically Signed: Alesha Wheeler    12/8/2024 2:56 PM EST    Workstation ID: [id number]             EKG: Sinus rhythm, no acute changes.         Assessment:   ACS/non-STEMI.  CAD, status post previous multiple stents.  Hypertension, uncontrolled.  Type 2 diabetes.  Dyslipidemia.  Morbid obesity.  Cigarette smoking.    Plan:   Continue current dual antiplatelet therapy and intravenous heparin.  Continue losartan, carvedilol, amlodipine for hypertension and CAD.  Continue high intensity statin therapy.  Check lipid panel.  Add Nitropaste to help with blood pressure control and angina management.  Continue Jardiance and rest of the current diabetes management per  hospitalist.  Cardiac catheterization study was recommended for further evaluation of coronary anatomy and further restratification.  This is to be followed by PCI if indicated.  The procedure was explained to the patient/family extensively. Indications, benefits, risks and alternatives were discussed. The patient understands well, and wishes to proceed.   Smoking cessation, diet and exercise recommended.  Further recommendations to follow.  Thank you for this consultation.    Greta Hearn MD, FACC, [facility]

## 2024-12-09 NOTE — PLAN OF CARE
Goal Outcome Evaluation:   Patient had no acute events during shift  A&Ox4, RA-2LNC while asleep for de-sating, NSR  Chest pain 0/10   Heparin gtt - titrated according to orders

## 2024-12-09 NOTE — PROGRESS NOTES
Continued Stay Note  [facility]     Patient Name: Adrien Sweet  MRN: [id number]  Today's Date: 12/9/2024    Admit Date: 12/8/2024    Plan: IDP   Discharge Plan       Row Name 12/09/24 1028       Plan    Plan Comments Provided a list of resources to Mr. Sweet with the contact information for Medicaid and Food stamps in [place].      Row Name 12/09/24 1011       Plan    Plan IDP    Patient/Family in Agreement with Plan yes    Plan Comments  spoke with Mr. Sweet, at the bedside. Pt lives with roomates in [facility] with ADL's. No DME/ HH/OPPT. Pt plans to discharge home. SDOH completed. Pt recently lost job. He is currently looking and interviewing. Pt requested resource list for food and other community resources. He states if he does not find a job soon he will be unable to pay for many things. AMINA/Gwendolyn will provide resource list today, at bedside.Pt is scheduled for a heart cath today. Pt states he will drive himself home. [facility]-Hai gonzalez. Confirmed [employer] [place].  will continue to follow.    Final Discharge Disposition Code 01 - home or self-care                   Discharge Codes    No documentation.                 Expected Discharge Date and Time       Expected Discharge Date Expected Discharge Time    Dec 10, 2024               JON Goodman

## 2024-12-09 NOTE — PROGRESS NOTES
[facility] Medicine Services  PROGRESS NOTE    Patient Name: Adrien Sweet  : 1965  MRN: [id number]    Date of Admission: 2024  Primary Care Physician: Hai Harrison MD    Subjective   Subjective     CC:  Chest pain     HPI:  Patient is a 59-year-old male seen and examined by me this a.m., he is pending further evaluation by cardiology and is currently n.p.o. on a heparin drip.  Plans are for likely left heart cath later today.      Objective   Objective     Vital Signs:   Temp:  [97.6 °F (36.4 °C)-98.7 °F (37.1 °C)] 98.1 °F (36.7 °C)  Heart Rate:  [73-91] 73  Resp:  [16-18] 18  BP: (132-187)/() 161/106  Flow (L/min) (Oxygen Therapy):  [2] 2     Physical Exam:  Constitutional: No acute distress, awake, alert, resting comfortably in bed, on 2L NC  HENT: NCAT, nares patent, mucous membranes moist  Respiratory: Clear to auscultation bilaterally, respiratory effort normal   Cardiovascular: RRR, no murmurs, rubs, or gallops  Gastrointestinal: Positive bowel sounds, soft, nontender, nondistended  Musculoskeletal: No bilateral ankle edema  Psychiatric: Appropriate affect, cooperative  Neurologic: Oriented x 3, strength symmetric in all extremities, Cranial Nerves grossly intact to confrontation, speech clear  Skin: No rashes      Results Reviewed:  LAB RESULTS:      Lab 24  0556 24  2249 24  1612 24  1408   WBC 6.14  --   --  8.11   HEMOGLOBIN 15.4  --   --  16.8   HEMATOCRIT 45.5  --   --  48.6   PLATELETS 221  --   --  252   NEUTROS ABS 2.91  --   --  5.52   IMMATURE GRANS (ABS) 0.04  --   --  0.02   LYMPHS ABS 2.51  --   --  1.98   MONOS ABS 0.39  --   --  0.40   EOS ABS 0.27  --   --  0.16   MCV 95.4  --   --  94.4   PROTIME  --   --   --  12.6   APTT  --   --   --  27.6*   HEPARIN ANTI-XA 0.15* 0.10*  --  0.10*   HSTROP T  --   --  42* 43*         Lab 24  0811 24  0556 24  1408   SODIUM 136  --  137   POTASSIUM 4.1   --  4.3   CHLORIDE 100  --  100   CO2 25.0  --  24.0   ANION GAP 11.0  --  13.0   BUN 18  --  24*   CREATININE 0.79  --  1.03   EGFR 103.6  --  84.7   GLUCOSE 187*  --  284*   CALCIUM 9.0  --  9.4   HEMOGLOBIN A1C  --  8.30*  --          Lab 12/08/24  1408   TOTAL PROTEIN 6.7   ALBUMIN 4.1   GLOBULIN 2.6   ALT (SGPT) 47*   AST (SGOT) 37   BILIRUBIN 0.3   ALK PHOS 64   LIPASE 197*         Lab 12/08/24  1612 12/08/24  1408   PROBNP  --  314.7   HSTROP T 42* 43*   PROTIME  --  12.6   INR  --  0.94         Lab 12/09/24  0811   CHOLESTEROL 200   LDL CHOL 87   HDL CHOL 30*   TRIGLYCERIDES 511*             Brief Urine Lab Results  (Last result in the past 365 days)        Color   Clarity   Blood   Leuk Est   Nitrite   Protein   CREAT   Urine HCG        10/15/24 1201 Yellow     Negative   Negative   Negative                     Microbiology Results Abnormal       None            XR Chest 1 View    Result Date: 12/8/2024  XR CHEST 1 VW Date of Exam: 12/8/2024 2:18 PM EST Indication: Chest Pain Triage Protocol Comparison: None available. Findings: Lungs are well expanded and appear clear. No pneumothorax or large pleural effusion is seen. Cardiomediastinal contours appear within normal limits.     Impression: Impression: No acute cardiopulmonary abnormality is identified. Electronically Signed: Alesha Wheeler  12/8/2024 2:56 PM EST  Workstation ID: [id number]         Current medications:  Scheduled Meds:amLODIPine, 10 mg, Oral, Daily  aspirin, 81 mg, Oral, Daily  carvedilol, 6.25 mg, Oral, BID With Meals  clopidogrel, 75 mg, Oral, Daily  empagliflozin, 10 mg, Oral, Daily  FLUoxetine, 20 mg, Oral, Daily  insulin lispro, 2-9 Units, Subcutaneous, 4x Daily AC & at Bedtime  losartan, 100 mg, Oral, Daily  nitroglycerin, 1 inch, Topical, Q6H  pharmacy consult - MTM, , Not Applicable, Daily  rosuvastatin, 40 mg, Oral, Daily  senna-docusate sodium, 2 tablet, Oral, BID  sodium chloride, 10 mL, Intravenous, Q12H      Continuous  Infusions:heparin, 15 Units/kg/hr, Last Rate: 15 Units/kg/hr (12/09/24 7841)  Pharmacy to Dose Heparin,       PRN Meds:.  acetaminophen **OR** acetaminophen **OR** acetaminophen    senna-docusate sodium **AND** polyethylene glycol **AND** bisacodyl **AND** bisacodyl    Calcium Replacement - Follow Nurse / BPA Driven Protocol    dextrose    dextrose    glucagon (human recombinant)    Magnesium Standard Dose Replacement - Follow Nurse / BPA Driven Protocol    nitroglycerin    ondansetron ODT **OR** ondansetron    Pharmacy to Dose Heparin    Phosphorus Replacement - Follow Nurse / BPA Driven Protocol    Potassium Replacement - Follow Nurse / BPA Driven Protocol    sodium chloride    sodium chloride    sodium chloride    Assessment & Plan   Assessment & Plan     Active Hospital Problems    Diagnosis  POA    **NSTEMI (non-ST elevated myocardial infarction) [I21.4]  Yes    Type 2 diabetes mellitus with circulatory disorder, with long-term current use of insulin [E11.59, Z79.4]  Not Applicable    HTN (hypertension) [I10]  Unknown    HLD (hyperlipidemia) [E78.5]  Unknown    Tobacco use [Z72.0]  Unknown    CAD (coronary artery disease) [I25.10]  Unknown      Resolved Hospital Problems   No resolved problems to display.        Brief Hospital Course to date:  Adrien Sweet is a 59 y.o. male presented to [facility] with complaints of chest pain, patient with past medical history of CAD s/p 7 stents, T2DM, HTN, HLD, and recently started smoking again. Patient has been more noncompliant with his medication regimen recently and also recently restarted smoking a few ago. Patient transferred to my services on the AM of 12/9, currently NPO and on heparin gtt. He was later seen by cardiology and plans are for LHC later today.       NSTEMI  HX of CAD s/p 7 stents  HLD  HTN  -- Cardiology consulted and following, plans for LHC on 12/9   -- heparin drip  -- cont asa, plavix, statin, coreg, norvasc, cozaar from home meds   -- npo   currently    -- FLP reviewed, total cholesterol 200, Triglycerides 511, LDL 87 HDL 30      DM   -- A1c 8.3 currently   -- diabetes educator  -- hold oral meds   -- cont jardiance   -- SSI coverage for now   -- Likely outpatient follow up with PCP      HTN  --continue regular home medications   -- Likely outpatient follow up with PCP     Elevated Lipase  -- no abd pain, N/V  -- monitor      Tobacco use   HX of alcohol use   - Cessation encouraged strongly     Expected Discharge Location and Transportation: Home   Expected Discharge   Expected Discharge Date: 12/10/2024; Expected Discharge Time:      VTE Prophylaxis:  Pharmacologic VTE prophylaxis orders are present.         AM-PAC 6 Clicks Score (PT): 24 (12/09/24 0800)    CODE STATUS:   Code Status and Medical Interventions: CPR (Attempt to Resuscitate); Full Support   Ordered at: 12/08/24 1645     Level Of Support Discussed With:    Patient     Code Status (Patient has no pulse and is not breathing):    CPR (Attempt to Resuscitate)     Medical Interventions (Patient has pulse or is breathing):    Full Support       JE Portillo, DO  12/09/24

## 2024-12-09 NOTE — PROGRESS NOTES
Pharmacy to Dose Heparin Infusion Note    Adrien Sweet is a 59 y.o. male receiving heparin infusion.     Therapy for (VTE/Cardiac): UA with workup for NSTEMI (Cardiac Protocol)  Patient Weight: 99.8 kg  Initial Bolus (Y/N): Yes  Any Bolus (Y/N): Yes     Signs or Symptoms of Bleeding: No S/Sx bleeding per RN and chart review    Cardiac (Not VTE)   Initial Bolus: 60 units/kg (Max 4,000 units)  Initial rate: 12 units/kg/hr (Max 1,000 units/hr)   Anti-Xa Bolus   Dose Infusion Hold   Time Infusion Rate Change (units/kg/hr) Repeat  Anti-Xa    < 0.11 50 units/kg  (4000 units Max) None Increase by  3 units/kg/hr 6 hours   0.11- 0.19 25 units/kg  (2000 units Max) None Increase by  2 units/kg/hr 6 hours   0.2 - 0.29 0 None Increase by  1 units/kg/hr 6 hours   0.3 - 0.5 0 None No Change 6 hours (after 2 consecutive levels in range check qAM)   0.51 - 0.6 0 None Decrease by  1 units/kg/hr 6 hours   0.61 - 0.8 0 30 minutes Decrease by  2 units/kg/hr 6 hours   0.81 - 1 0 60 minutes Decrease by  3 units/kg/hr 6 hours   >1 0 Hold  After Anti-Xa less than 0.5 decrease previous rate by  4 units/kg/hr  Every 2 hours until Anti-Xa  less than 0.5 then when infusion restarts in 6 hours     Results from last 7 days   Lab Units 12/09/24  0556 12/08/24  1408   INR   --  0.94   HEMOGLOBIN g/dL 15.4 16.8   HEMATOCRIT % 45.5 48.6   PLATELETS 10*3/mm3 221 252       Date   Time   Anti-Xa Current Rate (units/kg/hr) Bolus   (units) Rate Change   (units/kg/hr) New Rate (units/kg/hr) Repeat  Anti-Xa Comments /  Pump Check    12/8 14:08 0.10 ---new-- +4000 +10 10 21:30 Set up IV pump w/ RN Zain. TBW, bolus, rate confirmed. Pt counseled on UFH.   12/8 2345 0.10 10 4000 +3 13 0600 D/w RN   12/9 0556 0.15 13 2000 +2 15 1500 D/w GEORGINA Hastings  Clay PharmBRIAN  12/09/24   08:24 EST

## 2024-12-09 NOTE — PLAN OF CARE
Problem: Adult Inpatient Plan of Care  Goal: Plan of Care Review  Outcome: Progressing  Goal: Patient-Specific Goal (Individualized)  Outcome: Progressing  Goal: Absence of Hospital-Acquired Illness or Injury  Outcome: Progressing  Intervention: Identify and Manage Fall Risk  Recent Flowsheet Documentation  Taken 12/9/2024 1600 by Freddy Mayfield RN  Safety Promotion/Fall Prevention:   safety round/check completed   room organization consistent   nonskid shoes/slippers when out of bed   lighting adjusted   fall prevention program maintained   clutter free environment maintained   assistive device/personal items within reach   activity supervised  Taken 12/9/2024 1400 by Freddy Mayfield RN  Safety Promotion/Fall Prevention: patient off unit  Taken 12/9/2024 1200 by Freddy Mayfield RN  Safety Promotion/Fall Prevention:   safety round/check completed   room organization consistent   nonskid shoes/slippers when out of bed   lighting adjusted   fall prevention program maintained   clutter free environment maintained   activity supervised   assistive device/personal items within reach  Taken 12/9/2024 0800 by Freddy Mayfield RN  Safety Promotion/Fall Prevention:   safety round/check completed   room organization consistent   nonskid shoes/slippers when out of bed   lighting adjusted   fall prevention program maintained   clutter free environment maintained   activity supervised   assistive device/personal items within reach  Intervention: Prevent Skin Injury  Recent Flowsheet Documentation  Taken 12/9/2024 1600 by Freddy Mayfield RN  Body Position: position changed independently  Skin Protection: transparent dressing maintained  Taken 12/9/2024 1200 by Freddy Mayfield RN  Body Position: position changed independently  Skin Protection: transparent dressing maintained  Taken 12/9/2024 1000 by Freddy Mayfield RN  Body Position: position changed independently  Skin Protection: transparent dressing maintained  Taken  12/9/2024 0800 by Freddy Mayfield RN  Body Position: position changed independently  Skin Protection: transparent dressing maintained  Goal: Optimal Comfort and Wellbeing  Outcome: Progressing  Intervention: Provide Person-Centered Care  Recent Flowsheet Documentation  Taken 12/9/2024 1600 by Freddy Mayfield RN  Trust Relationship/Rapport: care explained  Taken 12/9/2024 1200 by Freddy Mayfield RN  Trust Relationship/Rapport: care explained  Taken 12/9/2024 1000 by Freddy Mayfield RN  Trust Relationship/Rapport: care explained  Taken 12/9/2024 0800 by Freddy Mayfield RN  Trust Relationship/Rapport: care explained  Goal: Readiness for Transition of Care  Outcome: Progressing     Problem: Comorbidity Management  Goal: Blood Glucose Level Within Target Range  Outcome: Progressing  Intervention: Monitor and Manage Glycemia  Recent Flowsheet Documentation  Taken 12/9/2024 1600 by Freddy Mayfield RN  Medication Review/Management: medications reviewed  Taken 12/9/2024 1200 by Freddy Mayfield RN  Medication Review/Management: medications reviewed  Taken 12/9/2024 0800 by Freddy Mayfield RN  Medication Review/Management: medications reviewed  Goal: Blood Pressure in Desired Range  Outcome: Progressing  Intervention: Maintain Blood Pressure Management  Recent Flowsheet Documentation  Taken 12/9/2024 1600 by Freddy Mayfield RN  Medication Review/Management: medications reviewed  Taken 12/9/2024 1200 by Freddy Mayfield RN  Medication Review/Management: medications reviewed  Taken 12/9/2024 0800 by Freddy Mayfield RN  Medication Review/Management: medications reviewed     Problem: Chest Pain  Goal: Resolution of Chest Pain Symptoms  Outcome: Progressing   Goal Outcome Evaluation:

## 2024-12-09 NOTE — CASE MANAGEMENT/SOCIAL WORK
Discharge Planning Assessment  [facility]     Patient Name: Adrien Sweet  MRN: [id number]  Today's Date: 12/9/2024    Admit Date: 12/8/2024    Plan: IDP   Discharge Needs Assessment       Row Name 12/09/24 1011       Living Environment    People in Home other (see comments)  Has roomates    Primary Care Provided by self    Provides Primary Care For no one    Family Caregiver if Needed parent(s)    Quality of Family Relationships unable to assess    Able to Return to Prior Arrangements yes       Transition Planning    Patient/Family Anticipates Transition to home    Patient/Family Anticipated Services at Transition none    Transportation Anticipated car, drives self                   Discharge Plan       Row Name 12/09/24 1011       Plan    Plan IDP    Patient/Family in Agreement with Plan yes    Plan Comments  spoke with Mr. Sweet, at the bedside. Pt lives with roomates in [place]. Independent with ADL's. No DME/ HH/OPPT. Pt plans to discharge home. SDOH completed. Pt recently lost job. He is currently looking and interviewing. Pt requested resource list for food and other community resources. He states if he does not find a job soon he will be unable to pay for many things. [facility]/Gwendolyn will provide resource list today, at bedside.Pt is scheduled for a heart cath today. Pt states he will drive himself home. [facility] g[facility]. Confirmed [employer] [place].  will continue to follow.    Final Discharge Disposition Code 01 - home or self-care                  Continued Care and Services - Admitted Since 12/8/2024    No active coordination exists for this encounter.       Expected Discharge Date and Time       Expected Discharge Date Expected Discharge Time    Dec 10, 2024            Demographic Summary       Row Name 12/09/24 1010       General Information    Admission Type inpatient    Reason for Consult discharge planning       Contact Information    Permission Granted to Share Info  With                    Functional Status       Row Name 12/09/24 1010       Functional Status    Usual Activity Tolerance good       Functional Status, IADL    Medications independent    Meal Preparation independent    Housekeeping independent    Laundry independent    Shopping independent                   Psychosocial    No documentation.                  Abuse/Neglect    No documentation.                  Legal    No documentation.                  Substance Abuse    No documentation.                  Patient Forms    No documentation.                     Deborah Rivers RN

## 2024-12-10 ENCOUNTER — TRANSCRIBE ORDERS (OUTPATIENT)
Dept: CARDIAC REHAB | Facility: HOSPITAL | Age: 59
End: 2024-12-10
Payer: MEDICAID

## 2024-12-10 ENCOUNTER — APPOINTMENT (OUTPATIENT)
Dept: CARDIOLOGY | Facility: HOSPITAL | Age: 59
End: 2024-12-10
Payer: MEDICAID

## 2024-12-10 ENCOUNTER — READMISSION MANAGEMENT (OUTPATIENT)
Dept: CALL CENTER | Facility: HOSPITAL | Age: 59
End: 2024-12-10
Payer: MEDICAID

## 2024-12-10 VITALS
BODY MASS INDEX: 31.78 KG/M2 | HEIGHT: 70 IN | SYSTOLIC BLOOD PRESSURE: 136 MMHG | OXYGEN SATURATION: 92 % | HEART RATE: 77 BPM | RESPIRATION RATE: 18 BRPM | TEMPERATURE: 98.1 F | WEIGHT: 222 LBS | DIASTOLIC BLOOD PRESSURE: 85 MMHG

## 2024-12-10 DIAGNOSIS — Z95.5 STENTED CORONARY ARTERY: Primary | ICD-10-CM

## 2024-12-10 LAB
ACT BLD: 193 SECONDS (ref 82–152)
ACT BLD: 285 SECONDS (ref 82–152)
ANION GAP SERPL CALCULATED.3IONS-SCNC: 16 MMOL/L (ref 5–15)
BUN SERPL-MCNC: 16 MG/DL (ref 6–20)
BUN/CREAT SERPL: 20.8 (ref 7–25)
CALCIUM SPEC-SCNC: 8.9 MG/DL (ref 8.6–10.5)
CHLORIDE SERPL-SCNC: 98 MMOL/L (ref 98–107)
CO2 SERPL-SCNC: 18 MMOL/L (ref 22–29)
CREAT SERPL-MCNC: 0.77 MG/DL (ref 0.76–1.27)
DEPRECATED RDW RBC AUTO: 44.1 FL (ref 37–54)
EGFRCR SERPLBLD CKD-EPI 2021: 104.4 ML/MIN/1.73
ERYTHROCYTE [DISTWIDTH] IN BLOOD BY AUTOMATED COUNT: 13 % (ref 12.3–15.4)
GLUCOSE BLDC GLUCOMTR-MCNC: 163 MG/DL (ref 70–130)
GLUCOSE BLDC GLUCOMTR-MCNC: 192 MG/DL (ref 70–130)
GLUCOSE BLDC GLUCOMTR-MCNC: 226 MG/DL (ref 70–130)
GLUCOSE SERPL-MCNC: 173 MG/DL (ref 65–99)
HCT VFR BLD AUTO: 47.7 % (ref 37.5–51)
HGB BLD-MCNC: 16.3 G/DL (ref 13–17.7)
MCH RBC QN AUTO: 32.1 PG (ref 26.6–33)
MCHC RBC AUTO-ENTMCNC: 34.2 G/DL (ref 31.5–35.7)
MCV RBC AUTO: 94.1 FL (ref 79–97)
PA ADP PRP-ACNC: 89 PRU
PLATELET # BLD AUTO: 253 10*3/MM3 (ref 140–450)
PMV BLD AUTO: 10 FL (ref 6–12)
POTASSIUM SERPL-SCNC: 4 MMOL/L (ref 3.5–5.2)
QT INTERVAL: 326 MS
QT INTERVAL: 424 MS
QTC INTERVAL: 368 MS
QTC INTERVAL: 454 MS
RBC # BLD AUTO: 5.07 10*6/MM3 (ref 4.14–5.8)
SODIUM SERPL-SCNC: 132 MMOL/L (ref 136–145)
WBC NRBC COR # BLD AUTO: 6.45 10*3/MM3 (ref 3.4–10.8)

## 2024-12-10 PROCEDURE — 93005 ELECTROCARDIOGRAM TRACING: CPT | Performed by: FAMILY MEDICINE

## 2024-12-10 PROCEDURE — 93306 TTE W/DOPPLER COMPLETE: CPT | Performed by: INTERNAL MEDICINE

## 2024-12-10 PROCEDURE — 93306 TTE W/DOPPLER COMPLETE: CPT

## 2024-12-10 PROCEDURE — 99239 HOSP IP/OBS DSCHRG MGMT >30: CPT | Performed by: FAMILY MEDICINE

## 2024-12-10 PROCEDURE — 82948 REAGENT STRIP/BLOOD GLUCOSE: CPT

## 2024-12-10 PROCEDURE — 80048 BASIC METABOLIC PNL TOTAL CA: CPT | Performed by: INTERNAL MEDICINE

## 2024-12-10 PROCEDURE — 93010 ELECTROCARDIOGRAM REPORT: CPT | Performed by: INTERNAL MEDICINE

## 2024-12-10 PROCEDURE — 63710000001 INSULIN LISPRO (HUMAN) PER 5 UNITS: Performed by: INTERNAL MEDICINE

## 2024-12-10 PROCEDURE — 85027 COMPLETE CBC AUTOMATED: CPT | Performed by: INTERNAL MEDICINE

## 2024-12-10 PROCEDURE — 99232 SBSQ HOSP IP/OBS MODERATE 35: CPT | Performed by: HOSPITALIST

## 2024-12-10 PROCEDURE — 85576 BLOOD PLATELET AGGREGATION: CPT | Performed by: INTERNAL MEDICINE

## 2024-12-10 RX ORDER — CARVEDILOL 12.5 MG/1
12.5 TABLET ORAL ONCE
Status: COMPLETED | OUTPATIENT
Start: 2024-12-10 | End: 2024-12-10

## 2024-12-10 RX ORDER — CARVEDILOL 12.5 MG/1
25 TABLET ORAL 2 TIMES DAILY WITH MEALS
Status: DISCONTINUED | OUTPATIENT
Start: 2024-12-10 | End: 2024-12-10 | Stop reason: HOSPADM

## 2024-12-10 RX ORDER — MELOXICAM 15 MG/1
15 TABLET ORAL DAILY
COMMUNITY

## 2024-12-10 RX ORDER — AMLODIPINE BESYLATE 10 MG/1
10 TABLET ORAL DAILY
Qty: 30 TABLET | Refills: 0 | Status: SHIPPED | OUTPATIENT
Start: 2024-12-11

## 2024-12-10 RX ORDER — NITROGLYCERIN 0.4 MG/1
0.4 TABLET SUBLINGUAL
Qty: 25 TABLET | Refills: 0 | Status: SHIPPED | OUTPATIENT
Start: 2024-12-10

## 2024-12-10 RX ORDER — CARVEDILOL 25 MG/1
25 TABLET ORAL 2 TIMES DAILY WITH MEALS
Qty: 60 TABLET | Refills: 0 | Status: SHIPPED | OUTPATIENT
Start: 2024-12-10

## 2024-12-10 RX ADMIN — ASPIRIN 81 MG: 81 TABLET, COATED ORAL at 09:21

## 2024-12-10 RX ADMIN — FLUOXETINE HYDROCHLORIDE 20 MG: 20 CAPSULE ORAL at 09:21

## 2024-12-10 RX ADMIN — INSULIN LISPRO 4 UNITS: 100 INJECTION, SOLUTION INTRAVENOUS; SUBCUTANEOUS at 11:31

## 2024-12-10 RX ADMIN — AMLODIPINE BESYLATE 10 MG: 5 TABLET ORAL at 09:19

## 2024-12-10 RX ADMIN — CLOPIDOGREL BISULFATE 75 MG: 75 TABLET ORAL at 09:20

## 2024-12-10 RX ADMIN — CARVEDILOL 25 MG: 12.5 TABLET, FILM COATED ORAL at 17:16

## 2024-12-10 RX ADMIN — INSULIN LISPRO 2 UNITS: 100 INJECTION, SOLUTION INTRAVENOUS; SUBCUTANEOUS at 17:16

## 2024-12-10 RX ADMIN — INSULIN LISPRO 2 UNITS: 100 INJECTION, SOLUTION INTRAVENOUS; SUBCUTANEOUS at 09:18

## 2024-12-10 RX ADMIN — CARVEDILOL 12.5 MG: 12.5 TABLET, FILM COATED ORAL at 11:31

## 2024-12-10 RX ADMIN — NICOTINE 1 PATCH: 21 PATCH TRANSDERMAL at 09:21

## 2024-12-10 RX ADMIN — ROSUVASTATIN 40 MG: 20 TABLET, FILM COATED ORAL at 09:20

## 2024-12-10 RX ADMIN — EMPAGLIFLOZIN 10 MG: 10 TABLET, FILM COATED ORAL at 09:19

## 2024-12-10 RX ADMIN — CARVEDILOL 12.5 MG: 12.5 TABLET, FILM COATED ORAL at 09:19

## 2024-12-10 RX ADMIN — LOSARTAN POTASSIUM 100 MG: 50 TABLET, FILM COATED ORAL at 09:19

## 2024-12-10 RX ADMIN — Medication 10 ML: at 09:22

## 2024-12-10 NOTE — PROGRESS NOTES
Pt. Referred for Phase II Cardiac Rehab. Staff discussed benefits of exercise, program protocol, and educational material provided. Teach back verified.  Patient scheduled for orientation at [facility] on Tuesday, January 7th, 2025 at 9:00am.

## 2024-12-10 NOTE — PLAN OF CARE
Problem: Adult Inpatient Plan of Care  Goal: Absence of Hospital-Acquired Illness or Injury  Intervention: Identify and Manage Fall Risk  Recent Flowsheet Documentation  Taken 12/10/2024 0400 by Rivas Mathew RN  Safety Promotion/Fall Prevention:   assistive device/personal items within reach   clutter free environment maintained   activity supervised   fall prevention program maintained   nonskid shoes/slippers when out of bed   safety round/check completed   room organization consistent  Taken 12/10/2024 0200 by Rivas Mathew RN  Safety Promotion/Fall Prevention:   assistive device/personal items within reach   clutter free environment maintained   activity supervised   fall prevention program maintained   nonskid shoes/slippers when out of bed   room organization consistent   safety round/check completed  Taken 12/10/2024 0000 by Rivas Mathew RN  Safety Promotion/Fall Prevention:   activity supervised   assistive device/personal items within reach   clutter free environment maintained   fall prevention program maintained   nonskid shoes/slippers when out of bed   room organization consistent   safety round/check completed  Taken 12/9/2024 2240 by Rivas Mathew RN  Safety Promotion/Fall Prevention:   activity supervised   assistive device/personal items within reach   clutter free environment maintained   fall prevention program maintained   nonskid shoes/slippers when out of bed   room organization consistent   safety round/check completed     Problem: Adult Inpatient Plan of Care  Goal: Absence of Hospital-Acquired Illness or Injury  Intervention: Prevent Skin Injury  Recent Flowsheet Documentation  Taken 12/10/2024 0400 by Rivas Mathew RN  Body Position: position changed independently  Skin Protection:   incontinence pads utilized   skin sealant/moisture barrier applied   transparent dressing maintained  Taken 12/10/2024 0200 by Rivas Mathew RN  Body Position: position  changed independently  Skin Protection:   skin sealant/moisture barrier applied   transparent dressing maintained  Taken 12/10/2024 0000 by Rivas Mathew RN  Body Position: position changed independently  Skin Protection:   transparent dressing maintained   skin sealant/moisture barrier applied  Taken 12/9/2024 2240 by Rivas Mathew RN  Body Position: position changed independently  Skin Protection:   incontinence pads utilized   skin sealant/moisture barrier applied   transparent dressing maintained   Goal Outcome Evaluation:  Plan of Care Reviewed With: patient        Progress: improving  Outcome Evaluation: a/o x 4; VSS, RA - 2L prn while resting; denies CP; pleasant, up at [place]; R radial site monitored; no acute changes to report; will continue POC

## 2024-12-10 NOTE — PLAN OF CARE
Problem: Adult Inpatient Plan of Care  Goal: Plan of Care Review  Outcome: Progressing  Goal: Patient-Specific Goal (Individualized)  Outcome: Progressing  Goal: Absence of Hospital-Acquired Illness or Injury  Outcome: Progressing  Intervention: Identify and Manage Fall Risk  Recent Flowsheet Documentation  Taken 12/10/2024 1600 by Freddy Mayfield RN  Safety Promotion/Fall Prevention:   safety round/check completed   room organization consistent   nonskid shoes/slippers when out of bed   lighting adjusted   fall prevention program maintained   assistive device/personal items within reach   clutter free environment maintained   activity supervised  Taken 12/10/2024 1400 by Freddy Mayfiled RN  Safety Promotion/Fall Prevention:   safety round/check completed   room organization consistent   nonskid shoes/slippers when out of bed   lighting adjusted   fall prevention program maintained   clutter free environment maintained   activity supervised   assistive device/personal items within reach  Taken 12/10/2024 1200 by Freddy Mayfield RN  Safety Promotion/Fall Prevention:   safety round/check completed   room organization consistent   nonskid shoes/slippers when out of bed   lighting adjusted   fall prevention program maintained   clutter free environment maintained   activity supervised   assistive device/personal items within reach  Taken 12/10/2024 1000 by Freddy Mayfield RN  Safety Promotion/Fall Prevention:   safety round/check completed   room organization consistent   nonskid shoes/slippers when out of bed   lighting adjusted   fall prevention program maintained   assistive device/personal items within reach   clutter free environment maintained   activity supervised  Taken 12/10/2024 0800 by Freddy Mayfield, RN  Safety Promotion/Fall Prevention:   safety round/check completed   room organization consistent   nonskid shoes/slippers when out of bed   lighting adjusted   fall prevention program maintained    clutter free environment maintained   assistive device/personal items within reach   activity supervised  Intervention: Prevent Skin Injury  Recent Flowsheet Documentation  Taken 12/10/2024 1600 by Freddy Mayfield RN  Body Position: position changed independently  Skin Protection: transparent dressing maintained  Taken 12/10/2024 1400 by Freddy Mayfield RN  Body Position: position changed independently  Skin Protection: transparent dressing maintained  Taken 12/10/2024 1200 by Freddy Mayfield RN  Body Position: position changed independently  Skin Protection: transparent dressing maintained  Taken 12/10/2024 1000 by Freddy Mayfield RN  Body Position: position changed independently  Skin Protection: transparent dressing maintained  Taken 12/10/2024 0800 by Freddy Mayfield RN  Body Position: position changed independently  Skin Protection: transparent dressing maintained  Intervention: Prevent and Manage VTE (Venous Thromboembolism) Risk  Recent Flowsheet Documentation  Taken 12/10/2024 0800 by Freddy Mayfield RN  VTE Prevention/Management:   patient refused intervention   SCDs (sequential compression devices) off   bilateral  Goal: Optimal Comfort and Wellbeing  Outcome: Progressing  Intervention: Provide Person-Centered Care  Recent Flowsheet Documentation  Taken 12/10/2024 1600 by Freddy Mayfield RN  Trust Relationship/Rapport: care explained  Taken 12/10/2024 1400 by Freddy Mayfield RN  Trust Relationship/Rapport: care explained  Taken 12/10/2024 1200 by Freddy Mayfield RN  Trust Relationship/Rapport: care explained  Taken 12/10/2024 1000 by Freddy Mayfield RN  Trust Relationship/Rapport: care explained  Taken 12/10/2024 0800 by Freddy Mayfield RN  Trust Relationship/Rapport: care explained  Goal: Readiness for Transition of Care  Outcome: Progressing     Problem: Comorbidity Management  Goal: Blood Glucose Level Within Target Range  Outcome: Progressing  Intervention: Monitor and Manage  Glycemia  Recent Flowsheet Documentation  Taken 12/10/2024 1600 by Freddy Mayfield RN  Medication Review/Management: medications reviewed  Taken 12/10/2024 1400 by Freddy Mayfield RN  Medication Review/Management: medications reviewed  Taken 12/10/2024 1200 by Freddy Mayfield RN  Medication Review/Management: medications reviewed  Taken 12/10/2024 1000 by Freddy Mayfield RN  Medication Review/Management: medications reviewed  Taken 12/10/2024 0800 by Freddy Mayfield RN  Medication Review/Management: medications reviewed  Goal: Blood Pressure in Desired Range  Outcome: Progressing  Intervention: Maintain Blood Pressure Management  Recent Flowsheet Documentation  Taken 12/10/2024 1600 by Freddy Mayfield RN  Medication Review/Management: medications reviewed  Taken 12/10/2024 1400 by Freddy Mayfield RN  Medication Review/Management: medications reviewed  Taken 12/10/2024 1200 by Freddy Mayfield RN  Medication Review/Management: medications reviewed  Taken 12/10/2024 1000 by Freddy Mayfield RN  Medication Review/Management: medications reviewed  Taken 12/10/2024 0800 by Freddy Mayfield RN  Medication Review/Management: medications reviewed     Problem: Chest Pain  Goal: Resolution of Chest Pain Symptoms  Outcome: Progressing   Goal Outcome Evaluation:

## 2024-12-10 NOTE — DISCHARGE SUMMARY
[facility] Medicine Services  DISCHARGE SUMMARY    Patient Name: Adrien Sweet  : 1965  MRN: [id number]    Date of Admission: 2024  2:07 PM  Date of Discharge:  12/10/2024  Primary Care Physician: Hai Harrison MD    Consults       Date and Time Order Name Status Description    2024  9:26 PM Inpatient Cardiology Consult Completed             Hospital Course     Presenting Problem: Chest pain     Active Hospital Problems    Diagnosis  POA    **NSTEMI (non-ST elevated myocardial infarction) [I21.4]  Yes    Type 2 diabetes mellitus with circulatory disorder, with long-term current use of insulin [E11.59, Z79.4]  Not Applicable    HTN (hypertension) [I10]  Unknown    HLD (hyperlipidemia) [E78.5]  Unknown    Tobacco use [Z72.0]  Unknown    CAD (coronary artery disease) [I25.10]  Unknown      Resolved Hospital Problems   No resolved problems to display.          Hospital Course:Adrien Sweet is a 59 y.o. male presented to [facility] with complaints of chest pain, patient with past medical history of CAD s/p 7 stents, T2DM, HTN, HLD, and recently started smoking again. Patient has been more noncompliant with his medication regimen recently and also recently restarted smoking a few ago. Patient transferred to my services on the AM of , currently NPO and on heparin gtt. He was later seen by cardiology and plans are for LHC later today. Patient had LHC on , patient had two new stents placed and tolerated well. Patient seen again on the AM of 12/10, doing well and discussed with cardiology, following repeat echo okay for discharge to home. Follow up with PCP in 1 week and follow up with cardiology in 1 month.         NSTEMI  HX of CAD s/p 7 stents  HLD  HTN  -- Cardiology consulted and following, plans for LHC on , two new stents placed and plans for discharge to home on 12/10   -- cont asa, plavix, statin, coreg, norvasc, cozaar from home meds   --  FLP reviewed, total cholesterol 200, Triglycerides 511, LDL 87 HDL 30      DM   -- A1c 8.3 currently   -- diabetes educator  -- hold oral meds   -- cont jardiance   -- SSI coverage for now   -- Likely outpatient follow up with PCP      HTN  --continue regular home medications   -- Likely outpatient follow up with PCP      Elevated Lipase  -- no abd pain, N/V  -- monitor      Tobacco use   HX of alcohol use   - Cessation encouraged strongly       Discharge Follow Up Recommendations for outpatient labs/diagnostics:   Follow up with regular PCP in 1 week   Follow up with Cardiology in 1 month     Day of Discharge     HPI:   Patient is a 58 yo M seen and examined this am and again early this afternoon, overall doing well, discussed with cardiology and plans for discharge to home following echo later today. No acute complaints or problems at this time.         Vital Signs:   Temp:  [97.9 °F (36.6 °C)-98.6 °F (37 °C)] 97.9 °F (36.6 °C)  Heart Rate:  [61-86] 77  Resp:  [16-18] 18  BP: (127-183)/() 136/85  Flow (L/min) (Oxygen Therapy):  [2] 2      Physical Exam:  Constitutional: No acute distress, awake, alert, resting comfortably in bed, on RA   HENT: NCAT, mucous membranes moist  Respiratory: Clear to auscultation bilaterally, respiratory effort normal   Cardiovascular: RRR, no murmurs, rubs, or gallops  Gastrointestinal: Positive bowel sounds, soft, nontender, nondistended  Musculoskeletal: No bilateral ankle edema  Psychiatric: Appropriate affect, cooperative  Neurologic: Oriented x 3, strength symmetric in all extremities, Cranial Nerves grossly intact to confrontation, speech clear  Skin: No rashes      Pertinent  and/or Most Recent Results     LAB RESULTS:      Lab 12/10/24  0704 12/09/24  1608 12/09/24  0556 12/08/24  2249 12/08/24  1408   WBC 6.45  --  6.14  --  8.11   HEMOGLOBIN 16.3  --  15.4  --  16.8   HEMATOCRIT 47.7  --  45.5  --  48.6   PLATELETS 253  --  221  --  252   NEUTROS ABS  --   --  2.91  --   5.52   IMMATURE GRANS (ABS)  --   --  0.04  --  0.02   LYMPHS ABS  --   --  2.51  --  1.98   MONOS ABS  --   --  0.39  --  0.40   EOS ABS  --   --  0.27  --  0.16   MCV 94.1  --  95.4  --  94.4   PROTIME  --   --   --   --  12.6   APTT  --   --   --   --  27.6*   HEPARIN ANTI-XA  --  >1.10* 0.15* 0.10* 0.10*         Lab 12/10/24  0703 12/09/24  0811 12/09/24  0556 12/08/24  1408   SODIUM 132* 136  --  137   POTASSIUM 4.0 4.1  --  4.3   CHLORIDE 98 100  --  100   CO2 18.0* 25.0  --  24.0   ANION GAP 16.0* 11.0  --  13.0   BUN 16 18  --  24*   CREATININE 0.77 0.79  --  1.03   EGFR 104.4 103.6  --  84.7   GLUCOSE 173* 187*  --  284*   CALCIUM 8.9 9.0  --  9.4   HEMOGLOBIN A1C  --   --  8.30*  --          Lab 12/08/24  1408   TOTAL PROTEIN 6.7   ALBUMIN 4.1   GLOBULIN 2.6   ALT (SGPT) 47*   AST (SGOT) 37   BILIRUBIN 0.3   ALK PHOS 64   LIPASE 197*         Lab 12/08/24  1612 12/08/24  1408   PROBNP  --  314.7   HSTROP T 42* 43*   PROTIME  --  12.6   INR  --  0.94         Lab 12/09/24  0811   CHOLESTEROL 200   LDL CHOL 87   HDL CHOL 30*   TRIGLYCERIDES 511*             Brief Urine Lab Results  (Last result in the past 365 days)        Color   Clarity   Blood   Leuk Est   Nitrite   Protein   CREAT   Urine HCG        10/15/24 1201 Yellow     Negative   Negative   Negative                   Microbiology Results (last 10 days)       ** No results found for the last 240 hours. **            Cardiac Catheterization/Vascular Study    Result Date: 12/9/2024  FINAL     99% in-stent and in segment restenosis of the distal RCA successfully stented with 2.5 x 23 mm PAT and reduced to 0%.  75% in-stent/in segment restenosis of the proximal RCA stented with 3.5 x 23 mm PAT optimized to 4.0 mm and reduced to less than 20%.  Diffuse IntraStent plaque of the mid RCA with up to 50% nonobstructive stenosis Nonobstructive 50% in-stent restenoses of the LAD. 60 to 70% stenosis of the second marginal branch of the circumflex. Normal left  ventricular systolic function, estimated EF 60%. RECOMMENDATIONS: Dual antiplatelet therapy indefinitely. Optimize medical therapy and risk factor management. The left coronary system disease we will manage with medical therapy for now with future consideration of PCI in case of angina refractory to medical management. Indications: ACS/non-STEMI. Access: Right radial. Procedures: Left heart catheterization. Left ventriculogram. Selective coronary angiography. PTCA/stenting of the distal RCA. PTCA/stenting of the proximal RCA. Arterial site hemostasis with radial band. Procedure narrative: The patient was brought to the catheterization lab in a fasting condition.  Access site was prepped and draped in standard sterile fashion.  Lidocaine was injected and arterial access was obtained by percutaneous anterior wall puncture technique.  A 6 Welsh arterial sheath was placed. Above procedures were performed without complications. Following the angiograms additional heparin was given.  ACT was monitored.  The right coronary artery was engaged with F R4 guide catheter.  A versa turn wire was advanced into the distal RCA across the 99% stenosis and was advanced into the PLV branch.  Whisper wire was advanced into the PDA for protection.  Balloon PTCA of the distal RCA and the proximal RCA was performed with 2.0 mm balloon.  Subsequently the distal RCA was stented with a 2.5 x 23 mm PAT which was placed from crux backwards overlapping with the previously placed stent.  The stent was fully deployed and postdilated with excellent results and 0% disease stenosis.  The proximal RCA in-stent restenosis was at the proximal edge of the stent which was covered with a 3.5 x 23 mm PAT which was fully deployed and then postdilated with 4.0 mm NC balloon at high pressures with satisfactory expansion and no significant residual stenosis and no evidence of complications.  Final angiograms were taken and the guide catheter was removed.   VAIBHAV flow was grade 3 before and after PCI. At the conclusion the arterial sheath was removed and hemostasis was achieved.  The patient was transferred to the unit in a stable condition. Hemodynamic Findings: Heart Rate: 90/minute. LV pressure: 140/4-14 mmHg, on pull back no gradient was recorded across the aortic valve. Angiographic Findings: Right coronary dominance. LM: Mild plaque, no significant disease. LAD: Has been previously stented over a long mid segment.  There is a 50% IntraStent focal stenosis and another 50% stenosis at the distal edge of the stent.  The distal vessel has nonocclusive plaque. LCX: Mild proximal plaque.  The first marginal has 40% stenosis.  The larger second marginal has a 60-70% stenosis. RCA: Dominant vessel which has been previously stented over a long proximal to distal segment.  There was diffuse IntraStent plaque noted with up to 40 to 50% stenosis just before the acute margin.  At the proximal edge of the stent and in the proximal segment there was a 75% stenosis.  At the distal edge of the stent before the crux and within the distalmost segment of the stent there was a 99% stenosis.  This appeared to be the culprit vessel.  The RCA was stented in the distal and proximal segments with satisfactory results and reducing the stenosis to no significant residual disease. LV: Left ventriculogram performed in 30 KELLY projection revealed normal global and regional left ventricular systolic function with estimated ejection fraction of 60%.  No mitral regurgitation was noted. Complications: No acute procedure related complications.     XR Chest 1 View    Result Date: 12/8/2024  XR CHEST 1 VW Date of Exam: 12/8/2024 2:18 PM EST Indication: Chest Pain Triage Protocol Comparison: None available. Findings: Lungs are well expanded and appear clear. No pneumothorax or large pleural effusion is seen. Cardiomediastinal contours appear within normal limits.     Impression: No acute cardiopulmonary  abnormality is identified. Electronically Signed: Alesha Liam  12/8/2024 2:56 PM EST  Workstation ID: [id number]                 Plan for Follow-up of Pending Labs/Results: N/A     Discharge Details        Discharge Medications        New Medications        Instructions Start Date   nitroglycerin 0.4 MG SL tablet  Commonly known as: NITROSTAT   0.4 mg, Sublingual, Every 5 Minutes PRN, Take no more than 3 doses in 15 minutes.             Changes to Medications        Instructions Start Date   carvedilol 25 MG tablet  Commonly known as: COREG  What changed:   medication strength  how much to take   25 mg, Oral, 2 Times Daily With Meals             Continue These Medications        Instructions Start Date   amLODIPine 10 MG tablet  Commonly known as: NORVASC   10 mg, Oral, Daily   Start Date: December 11, 2024     aspirin 81 MG EC tablet   81 mg, Daily      clopidogrel 75 MG tablet  Commonly known as: PLAVIX   75 mg, Daily      colchicine 0.6 MG tablet   0.6 mg, Oral, Daily PRN      fenofibrate 145 MG tablet  Commonly known as: TRICOR   145 mg, Daily      FLUoxetine 20 MG capsule  Commonly known as: PROzac   20 mg, Daily      glimepiride 4 MG tablet  Commonly known as: AMARYL   4 mg, 2 Times Daily      Insulin Lispro 100 UNIT/ML injection  Commonly known as: humaLOG   5 Units, As Needed      Jardiance 10 MG tablet tablet  Generic drug: empagliflozin   10 mg, Daily      losartan 100 MG tablet  Commonly known as: COZAAR   100 mg, Daily      Melatonin 3 MG capsule   2 capsules, Daily PRN      meloxicam 15 MG tablet  Commonly known as: MOBIC   15 mg, Daily      metFORMIN 500 MG tablet  Commonly known as: GLUCOPHAGE   500 mg, 2 Times Daily With Meals      omeprazole 20 MG capsule  Commonly known as: priLOSEC   20 mg, Daily      ondansetron ODT 4 MG disintegrating tablet  Commonly known as: ZOFRAN-ODT   4 mg, Translingual, 4 Times Daily PRN      rosuvastatin 40 MG tablet  Commonly known as: CRESTOR   40 mg, Daily              Stop These Medications      Xyosted 75 MG/0.5ML solution auto-injector  Generic drug: Testosterone Enanthate              No Known Allergies      Discharge Disposition:  Home or Self Care    Diet:  Hospital:  Diet Order   Procedures    Diet: Cardiac, Diabetic; Healthy Heart (2-3 Na+); Consistent Carbohydrate; Fluid Consistency: Thin (IDDSI 0)            Activity:  Resume previous     Restrictions or Other Recommendations:  Follow up as below        CODE STATUS:    Code Status and Medical Interventions: CPR (Attempt to Resuscitate); Full Support   Ordered at: 12/08/24 5444     Level Of Support Discussed With:    Patient     Code Status (Patient has no pulse and is not breathing):    CPR (Attempt to Resuscitate)     Medical Interventions (Patient has pulse or is breathing):    Full Support       Future Appointments   Date Time Provider Department Center   1/7/2025  9:00 AM [facility]       Additional Instructions for the Follow-ups that You Need to Schedule       Ambulatory Referral to Cardiac Rehab   As directed      Discharge Follow-up with PCP   As directed       Currently Documented PCP:    Hai Harrison MD    PCP Phone Number:    [phone]     Follow Up Details: Follow up with PCP in 1 week        Discharge Follow-up with Specified Provider: Follow up with Dr. Hearn in 1 month   As directed      To: Follow up with Dr. Hearn in 1 month                      JE Portillo DO  12/10/24      Time Spent on Discharge:  I spent  40  minutes on this discharge activity which included: face-to-face encounter with the patient, reviewing the data in the system, coordination of the care with the nursing staff as well as consultants, documentation, and entering orders.

## 2024-12-10 NOTE — PROGRESS NOTES
[facility] Cardiology    Inpatient Progress Note      Chief Complaint/Reason for service:    Follow up NSTEMI         Subjective:       Patient sitting up in bed.  Denies chest pain, shortness of breath. Right radial access site stable, without bleeding or bruising.     Past medical, surgical, social and family history reviewed in the patient's electronic medical record.         Objective:      Infusions:        Medications:    Current Facility-Administered Medications:     acetaminophen (TYLENOL) tablet 650 mg, 650 mg, Oral, Q4H PRN **OR** acetaminophen (TYLENOL) 160 MG/5ML oral solution 650 mg, 650 mg, Oral, Q4H PRN **OR** acetaminophen (TYLENOL) suppository 650 mg, 650 mg, Rectal, Q4H PRN, Greta Hearn MD    amLODIPine (NORVASC) tablet 10 mg, 10 mg, Oral, Daily, Greta Hearn MD, 10 mg at 12/10/24 0919    aspirin EC tablet 81 mg, 81 mg, Oral, Daily, Greta Hearn MD, 81 mg at 12/10/24 0921    sennosides-docusate (PERICOLACE) 8.6-50 MG per tablet 2 tablet, 2 tablet, Oral, BID **AND** polyethylene glycol (MIRALAX) packet 17 g, 17 g, Oral, Daily PRN **AND** bisacodyl (DULCOLAX) EC tablet 5 mg, 5 mg, Oral, Daily PRN **AND** bisacodyl (DULCOLAX) suppository 10 mg, 10 mg, Rectal, Daily PRN, Greta Hearn MD    Calcium Replacement - Follow Nurse / BPA Driven Protocol, , Not Applicable, PRN, Greta Hearn MD    carvedilol (COREG) tablet 12.5 mg, 12.5 mg, Oral, Once, Blanche Clark APRN    carvedilol (COREG) tablet 25 mg, 25 mg, Oral, BID With Meals, Blanche Clark APRN    clopidogrel (PLAVIX) tablet 75 mg, 75 mg, Oral, Daily, Greta Hearn MD, 75 mg at 12/10/24 0920    dextrose (D50W) (25 g/50 mL) IV injection 25 g, 25 g, Intravenous, Q15 Min PRN, Greta Hearn MD    dextrose (GLUTOSE) oral gel 15 g, 15 g, Oral, Q15 Min PRN, Greta Hearn MD    empagliflozin (JARDIANCE) tablet 10 mg, 10 mg, Oral, Daily, rGeta Hearn MD, 10 mg at 12/10/24 0919    FLUoxetine (PROzac) capsule 20 mg, 20 mg, Oral,  Daily, Greta Hearn MD, 20 mg at 12/10/24 0921    glucagon (GLUCAGEN) injection 1 mg, 1 mg, Intramuscular, Q15 Min PRN, Greta Hearn MD    Insulin Lispro (humaLOG) injection 2-9 Units, 2-9 Units, Subcutaneous, 4x Daily AC & at Bedtime, Greta Hearn MD, 2 Units at 12/10/24 0918    losartan (COZAAR) tablet 100 mg, 100 mg, Oral, Daily, Greta Hearn MD, 100 mg at 12/10/24 0919    Magnesium Standard Dose Replacement - Follow Nurse / BPA Driven Protocol, , Not Applicable, PRN, Greta Hearn MD    nicotine (NICODERM CQ) 21 MG/24HR patch 1 patch, 1 patch, Transdermal, Q24H, CALLIE Portillo, DO, 1 patch at 12/10/24 0921    nitroglycerin (NITROSTAT) ointment 1 inch, 1 inch, Topical, Q6H, Greta Hearn MD    nitroglycerin (NITROSTAT) SL tablet 0.4 mg, 0.4 mg, Sublingual, Q5 Min PRN, Greta Hearn MD    ondansetron ODT (ZOFRAN-ODT) disintegrating tablet 4 mg, 4 mg, Oral, Q6H PRN **OR** ondansetron (ZOFRAN) injection 4 mg, 4 mg, Intravenous, Q6H PRN, Greta Hearn MD    Pharmacy Consult - MTM, , Not Applicable, Daily, Greta Hearn MD    Phosphorus Replacement - Follow Nurse / BPA Driven Protocol, , Not Applicable, PRN, Greta Hearn MD    Potassium Replacement - Follow Nurse / BPA Driven Protocol, , Not Applicable, PRN, Greta Hearn MD    rosuvastatin (CRESTOR) tablet 40 mg, 40 mg, Oral, Daily, Greta Hearn MD, 40 mg at 12/10/24 0920    sodium chloride 0.9 % flush 10 mL, 10 mL, Intravenous, PRN, Greta Hearn MD    sodium chloride 0.9 % flush 10 mL, 10 mL, Intravenous, Q12H, Greta Hearn MD, 10 mL at 12/10/24 0922    sodium chloride 0.9 % flush 10 mL, 10 mL, Intravenous, PRN, Greta Hearn MD    sodium chloride 0.9 % infusion 40 mL, 40 mL, Intravenous, DANIELLA, Greta Hearn MD    Vital Sign Min/Max for last 24 hours  Temp  Min: 97.9 °F (36.6 °C)  Max: 98.6 °F (37 °C)   BP  Min: 127/69  Max: 183/120   Pulse  Min: 61  Max: 86   Resp  Min: 16  Max: 18   SpO2  Min: 92 %  Max: 98 %   No data recorded      Intake/Output Summary  (Last 24 hours) at 12/10/2024 0925  Last data filed at 12/9/2024 1800  Gross per 24 hour   Intake 480 ml   Output --   Net 480 ml           CONSTITUTIONAL: No acute distress  RESPIRATORY: Normal effort. Clear to auscultation bilaterally without wheezing or rales  CARDIOVASCULAR: Regular rate and rhythm with normal S1 and S2. Without murmur. There is no lower extremity edema bilaterally. Right radial access site without bruising or bleeding.     Labs/studies:  Available lab and imaging results were reviewed by myself today        Tele:  NSR          Assessment/Plan:         NSTEMI (non-ST elevated myocardial infarction)    Type 2 diabetes mellitus with circulatory disorder, with long-term current use of insulin    HTN (hypertension)    HLD (hyperlipidemia)    Tobacco use    CAD (coronary artery disease)       ASSESSMENT:  ACS/non-STEMI.  CAD, status post previous multiple stents.  [facility] 11/09 with 99% ISR of distal RCA status post PAT x1, 75% ISR of proximal RCA status post PAT x 1, diffuse intra-stent plaque of mid RCA up to 50%, nonobstructive 50% ISR of the LAD, 60-70% stenosis of the second marginal branch of the circ.   Hypertension, uncontrolled.  Type 2 diabetes.  Dyslipidemia.  Morbid obesity.  Cigarette smoking    PLAN:  Continue DAPT indefinitely.   Echocardiogram today.   Increasing carvedilol to 25 mg twice a day due to HTN.   Continue losartan, amlodipine for HTN.   Continue Jardiance.  Diabetes management per hospitalists.   Discussed smoking cessation, heart healthy diet and regular exercise.   If echo acceptable, possibly ready for discharge home later today from cardiology standpoint.   Follow up with Dr. Hearn in 4-6 weeks.       Electronically signed by ERASMO Juares, 12/10/24, 10:09 AM EST.

## 2024-12-11 LAB
ASCENDING AORTA: 3.4 CM
BH CV ECHO MEAS - AO MAX PG: 10.2 MMHG
BH CV ECHO MEAS - AO MEAN PG: 5.8 MMHG
BH CV ECHO MEAS - AO ROOT DIAM: 3.2 CM
BH CV ECHO MEAS - AO V2 MAX: 159.8 CM/SEC
BH CV ECHO MEAS - AO V2 VTI: 29.9 CM
BH CV ECHO MEAS - AVA(I,D): 1.54 CM2
BH CV ECHO MEAS - EDV(CUBED): 117.6 ML
BH CV ECHO MEAS - EDV(MOD-SP2): 110 ML
BH CV ECHO MEAS - EDV(MOD-SP4): 121 ML
BH CV ECHO MEAS - EF(MOD-BP): 53.7 %
BH CV ECHO MEAS - EF(MOD-SP2): 56 %
BH CV ECHO MEAS - EF(MOD-SP4): 49.5 %
BH CV ECHO MEAS - ESV(CUBED): 42.9 ML
BH CV ECHO MEAS - ESV(MOD-SP2): 48.4 ML
BH CV ECHO MEAS - ESV(MOD-SP4): 61.1 ML
BH CV ECHO MEAS - FS: 28.6 %
BH CV ECHO MEAS - IVS/LVPW: 1.04 CM
BH CV ECHO MEAS - IVSD: 1.3 CM
BH CV ECHO MEAS - LA DIMENSION: 4.2 CM
BH CV ECHO MEAS - LAT PEAK E' VEL: 9.7 CM/SEC
BH CV ECHO MEAS - LV MASS(C)D: 246.7 GRAMS
BH CV ECHO MEAS - LV MAX PG: 2.26 MMHG
BH CV ECHO MEAS - LV MEAN PG: 1 MMHG
BH CV ECHO MEAS - LV V1 MAX: 75.2 CM/SEC
BH CV ECHO MEAS - LV V1 VTI: 14.7 CM
BH CV ECHO MEAS - LVIDD: 4.9 CM
BH CV ECHO MEAS - LVIDS: 3.5 CM
BH CV ECHO MEAS - LVOT AREA: 3.1 CM2
BH CV ECHO MEAS - LVOT DIAM: 2 CM
BH CV ECHO MEAS - LVPWD: 1.25 CM
BH CV ECHO MEAS - MED PEAK E' VEL: 5.6 CM/SEC
BH CV ECHO MEAS - MV A MAX VEL: 101 CM/SEC
BH CV ECHO MEAS - MV DEC TIME: 0.2 SEC
BH CV ECHO MEAS - MV E MAX VEL: 73.4 CM/SEC
BH CV ECHO MEAS - MV E/A: 0.73
BH CV ECHO MEAS - PA ACC TIME: 0.11 SEC
BH CV ECHO MEAS - PA V2 MAX: 111 CM/SEC
BH CV ECHO MEAS - RAP SYSTOLE: 3 MMHG
BH CV ECHO MEAS - SV(LVOT): 46.1 ML
BH CV ECHO MEAS - SV(MOD-SP2): 61.6 ML
BH CV ECHO MEAS - SV(MOD-SP4): 59.9 ML
BH CV ECHO MEAS - TAPSE (>1.6): 2.23 CM
BH CV ECHO MEASUREMENTS AVERAGE E/E' RATIO: 9.59
BH CV XLRA - RV BASE: 4.1 CM
BH CV XLRA - RV LENGTH: 8.4 CM
BH CV XLRA - RV MID: 3.3 CM
BH CV XLRA - TDI S': 14.3 CM/SEC
IVRT: 88 MS
LEFT ATRIUM VOLUME INDEX: 24.2 ML/M2
LV EF 2D ECHO EST: 55 %

## 2024-12-11 NOTE — OUTREACH NOTE
Prep Survey      Flowsheet Row Responses   [facility] patient discharged from? [place]   Is LACE score < 7 ? No   Eligibility Readm Mgmt   Discharge diagnosis NSTEMI-left heart cath   Does the patient have one of the following disease processes/diagnoses(primary or secondary)? Acute MI (STEMI,NSTEMI)   Does the patient have Home health ordered? No   Is there a DME ordered? No   Prep survey completed? Yes            HEBER OLGUIN - Registered Nurse

## 2024-12-17 ENCOUNTER — READMISSION MANAGEMENT (OUTPATIENT)
Dept: CALL CENTER | Facility: HOSPITAL | Age: 59
End: 2024-12-17
Payer: MEDICAID

## 2024-12-17 NOTE — OUTREACH NOTE
AMI Week 1 Survey      Flowsheet Row Responses   [facility] facility patient discharged from? [place]   Does the patient have one of the following disease processes/diagnoses(primary or secondary)? Acute MI (STEMI,NSTEMI)   Week 1 attempt successful? No   Unsuccessful attempts Attempt 1            WERNER LEDESMA - Registered Nurse

## 2024-12-18 ENCOUNTER — READMISSION MANAGEMENT (OUTPATIENT)
Dept: CALL CENTER | Facility: HOSPITAL | Age: 59
End: 2024-12-18
Payer: MEDICAID

## 2024-12-18 NOTE — OUTREACH NOTE
AMI Week 1 Survey      Flowsheet Row Responses   [facility] facility patient discharged from? [place]   Does the patient have one of the following disease processes/diagnoses(primary or secondary)? Acute MI (STEMI,NSTEMI)   Week 1 attempt successful? No   Unsuccessful attempts Attempt 2            GARCIA HART - Registered Nurse

## 2024-12-30 ENCOUNTER — READMISSION MANAGEMENT (OUTPATIENT)
Dept: CALL CENTER | Facility: HOSPITAL | Age: 59
End: 2024-12-30
Payer: MEDICAID

## 2024-12-30 NOTE — OUTREACH NOTE
AMI Week 3 Survey      Flowsheet Row Responses   [facility] facility patient discharged from? [place]   Does the patient have one of the following disease processes/diagnoses(primary or secondary)? Acute MI (STEMI,NSTEMI)   Week 3 attempt successful? No   Unsuccessful attempts Attempt 1            Candace PUENTES - Licensed Nurse

## 2025-04-08 ENCOUNTER — TELEPHONE (OUTPATIENT)
Dept: CARDIOLOGY | Facility: CLINIC | Age: 60
End: 2025-04-08
Payer: MEDICAID

## 2025-04-08 NOTE — TELEPHONE ENCOUNTER
Caller: Adrien Sweet    Relationship: Self    Best call back number: [phone]    What is the best time to reach you: ANYTIME    Who are you requesting to speak with (clinical staff, provider,  specific staff member): ANYONE    What was the call regarding: PATIENT HAD HEART CATH IN DECEMBER OF 2024 AND HOSPITAL VISIT DUE TO HEART ATTACK IN JANUARY OF THIS YEAR. WOULD LIKE TO RESCHEDULE HOSPITAL FU. NO AVAILABILITY ON MY END OF THE SCHEDULE TILL AUGUST. PLEASE ADVISE IF PATIENT CAN BE SCHEDULED FIRST AVAIL OR SOONER DUE TO APPT BEING OVERDUE. SAID HE CAN'T DO ANY APPTS THIS MONTH AND WILL BE AVAILABLE STARTING MAY.

## 2025-06-07 ENCOUNTER — APPOINTMENT (OUTPATIENT)
Dept: CARDIOLOGY | Facility: HOSPITAL | Age: 60
End: 2025-06-07
Payer: MEDICAID

## 2025-06-07 ENCOUNTER — HOSPITAL ENCOUNTER (EMERGENCY)
Facility: HOSPITAL | Age: 60
Discharge: HOME | End: 2025-06-07
Attending: EMERGENCY MEDICINE
Payer: MEDICAID

## 2025-06-07 VITALS
RESPIRATION RATE: 16 BRPM | OXYGEN SATURATION: 96 % | SYSTOLIC BLOOD PRESSURE: 148 MMHG | TEMPERATURE: 98.7 F | HEIGHT: 70 IN | WEIGHT: 212 LBS | BODY MASS INDEX: 30.35 KG/M2 | HEART RATE: 96 BPM | DIASTOLIC BLOOD PRESSURE: 107 MMHG

## 2025-06-07 DIAGNOSIS — I82.401 ACUTE DEEP VEIN THROMBOSIS (DVT) OF RIGHT LOWER EXTREMITY, UNSPECIFIED VEIN: Primary | ICD-10-CM

## 2025-06-07 LAB
BH CV LOW VAS RIGHT PERONEAL VESSEL: 1
BH CV LOW VAS RIGHT POPLITEAL SPONT: 1
BH CV LOW VAS RIGHT POSTERIOR TIBIAL VESSEL: 1
BH CV LOWER VASCULAR LEFT COMMON FEMORAL AUGMENT: NORMAL
BH CV LOWER VASCULAR LEFT COMMON FEMORAL COMPRESS: NORMAL
BH CV LOWER VASCULAR LEFT COMMON FEMORAL PHASIC: NORMAL
BH CV LOWER VASCULAR LEFT COMMON FEMORAL SPONT: NORMAL
BH CV LOWER VASCULAR RIGHT COMMON FEMORAL AUGMENT: NORMAL
BH CV LOWER VASCULAR RIGHT COMMON FEMORAL COMPRESS: NORMAL
BH CV LOWER VASCULAR RIGHT COMMON FEMORAL PHASIC: NORMAL
BH CV LOWER VASCULAR RIGHT COMMON FEMORAL SPONT: NORMAL
BH CV LOWER VASCULAR RIGHT DISTAL FEMORAL COMPRESS: NORMAL
BH CV LOWER VASCULAR RIGHT GASTRONEMIUS COMPRESS: NORMAL
BH CV LOWER VASCULAR RIGHT GREATER SAPH AK COMPRESS: NORMAL
BH CV LOWER VASCULAR RIGHT GREATER SAPH BK COMPRESS: NORMAL
BH CV LOWER VASCULAR RIGHT LESSER SAPH COMPRESS: NORMAL
BH CV LOWER VASCULAR RIGHT MID FEMORAL AUGMENT: NORMAL
BH CV LOWER VASCULAR RIGHT MID FEMORAL COMPRESS: NORMAL
BH CV LOWER VASCULAR RIGHT MID FEMORAL PHASIC: NORMAL
BH CV LOWER VASCULAR RIGHT MID FEMORAL SPONT: NORMAL
BH CV LOWER VASCULAR RIGHT PERONEAL COMPRESS: NORMAL
BH CV LOWER VASCULAR RIGHT POPLITEAL COMPRESS: NORMAL
BH CV LOWER VASCULAR RIGHT POPLITEAL PHASIC: NORMAL
BH CV LOWER VASCULAR RIGHT POPLITEAL SPONT: NORMAL
BH CV LOWER VASCULAR RIGHT POSTERIOR TIBIAL COMPRESS: NORMAL
BH CV LOWER VASCULAR RIGHT PROFUNDA FEMORAL COMPRESS: NORMAL
BH CV LOWER VASCULAR RIGHT PROXIMAL FEMORAL COMPRESS: NORMAL
BH CV LOWER VASCULAR RIGHT SAPHENOFEMORAL JUNCTION COMPRESS: NORMAL
BH CV VAS PRELIMINARY FINDINGS SCRIPTING: 1

## 2025-06-07 PROCEDURE — 93971 EXTREMITY STUDY: CPT

## 2025-06-07 PROCEDURE — 93971 EXTREMITY STUDY: CPT | Performed by: INTERNAL MEDICINE

## 2025-06-07 PROCEDURE — 99284 EMERGENCY DEPT VISIT MOD MDM: CPT

## 2025-06-07 NOTE — PROGRESS NOTES
Patient has been initiated on Apixaban (Eliquis) during ED vist 2/2 (+) DVT. Education provided on 6/7/2025 verbally and in writing. Information provided includes effects of medication, drug-drug and drug-food interactions, and signs/symptoms of bleeding and clotting. Patient/family verbalized understanding through teach back. All pertinent questions were answered by pharmacist.    Thank you,  Rusty Pollock, PharmD  06/07/25

## 2025-06-07 NOTE — ED PROVIDER NOTES
EMERGENCY DEPARTMENT ENCOUNTER    Pt Name: Adrien Sweet  MRN: [id number]  Pt :   1965  Room Number:  RW3/R3  Date of encounter:  2025  PCP: Hai Harrison MD  ED Provider: ERASMO Davidson    Historian: Patient    HPI:  Chief Complaint: Right calf pain    Context: Adrien Sweet is a 59 y.o. male who presents to the ED c/o right calf pain.  Patient complains of pain in his right calf over the past 2 weeks.  He reports that the pain increases with any kind of long period of standing, activity.  Patient denies chest pain, shortness of breath.  He denies any history of DVT, PE.  Patient denies any recent travel, recent immobilization.  HPI     REVIEW OF SYSTEMS  A chief complaint appropriate review of systems was completed and is negative except as noted in the HPI.     PAST MEDICAL HISTORY  Past Medical History:   Diagnosis Date    Diabetes mellitus     Diverticulosis     Fatty liver     Hyperlipidemia     Hypertension     Myocardial infarction        PAST SURGICAL HISTORY  Past Surgical History:   Procedure Laterality Date    CARDIAC CATHETERIZATION      CARDIAC CATHETERIZATION N/A 2024    Procedure: Left Heart Cath - Right radial access;  Surgeon: Greta Hearn MD;  Location:  [facility] CATH INVASIVE LOCATION;  Service: Cardiovascular;  Laterality: N/A;    CARDIAC CATHETERIZATION N/A 2024    Procedure: Stent PAT coronary;  Surgeon: Greta Hearn MD;  Location:  [facility] CATH INVASIVE LOCATION;  Service: Cardiovascular;  Laterality: N/A;    CORONARY STENT PLACEMENT         FAMILY HISTORY  Family History   Problem Relation Age of Onset    Cancer Maternal Uncle     Diabetes Maternal Grandmother        SOCIAL HISTORY  Social History     Socioeconomic History    Marital status: Single   Tobacco Use    Smoking status: Every Day     Types: Cigarettes   Vaping Use    Vaping status: Never Used   Substance and Sexual Activity    Alcohol use: Yes     Comment: 2X WEEKLY    Drug use: Yes      Types: Marijuana     Comment: OCCASIONAL       ALLERGIES  Patient has no known allergies.    PHYSICAL EXAM  Physical Exam  Vitals and nursing note reviewed.   Constitutional:       General: He is not in acute distress.     Appearance: Normal appearance. He is not ill-appearing.   HENT:      Head: Normocephalic and atraumatic.      Nose: Nose normal.   Eyes:      Extraocular Movements: Extraocular movements intact.      Conjunctiva/sclera: Conjunctivae normal.   Cardiovascular:      Rate and Rhythm: Normal rate.      Pulses: Normal pulses.      Heart sounds: Normal heart sounds.   Pulmonary:      Effort: Pulmonary effort is normal. No respiratory distress.      Breath sounds: Normal breath sounds.   Musculoskeletal:      Cervical back: Normal range of motion and neck supple.      Right lower leg: Swelling and tenderness (calf pain) present.   Skin:     General: Skin is warm and dry.   Neurological:      General: No focal deficit present.      Mental Status: He is alert and oriented to person, place, and time.   Psychiatric:         Mood and Affect: Mood normal.         Behavior: Behavior normal.           LAB RESULTS  Results for orders placed or performed during the hospital encounter of 06/07/25   Duplex Venous Lower Extremity - Right CAR    Collection Time: 06/07/25  2:02 PM   Result Value Ref Range    BH CV VAS PRELIMINARY FINDINGS SCRIPTING 1.0     Right Popliteal Spont 1.0     Right Posterior Tibial Vessel 1.0     Right Peroneal Vessel 1.0     Right Common Femoral Spont Y     Right Common Femoral Phasic Y     Right Common Femoral Compress C     Right Common Femoral Augment Y     Right Saphenofemoral Junction Compress C     Right Profunda Femoral Compress C     Right Proximal Femoral Compress C     Right Mid Femoral Spont Y     Right Mid Femoral Phasic Y     Right Mid Femoral Compress C     Right Mid Femoral Augment Y     Right Distal Femoral Compress C     Right Popliteal Spont Y     Right Popliteal Phasic Y      Right Popliteal Compress P     Right Posterior Tibial Compress N     Right Peroneal Compress N     Right Gastronemius Compress C     Right Greater Saph AK Compress C     Right Greater Saph BK Compress C     Right Lesser Saph Compress C     Left Common Femoral Spont Y     Left Common Femoral Phasic Y     Left Common Femoral Compress C     Left Common Femoral Augment Y        If labs were ordered, I independently reviewed the results and considered them in treating the patient.    RADIOLOGY  No orders to display     [] Radiologist's Report Reviewed:  I ordered and independently interpreted the above noted radiographic studies.  See radiologist's dictation for official interpretation.      PROCEDURES    Procedures    No orders to display       MEDICATIONS GIVEN IN ER    Medications - No data to display    MEDICAL DECISION MAKING, PROGRESS, and CONSULTS   Medical Decision Making  Adrien Sweet is a 59 y.o. male who presents to the ED c/o right calf pain.  Patient complains of pain in his right calf over the past 2 weeks.  He reports that the pain increases with any kind of long period of standing, activity.  Patient denies chest pain, shortness of breath.  He denies any history of DVT, PE.  Patient denies any recent travel, recent immobilization.      Problems Addressed:  Acute deep vein thrombosis (DVT) of right lower extremity, unspecified vein: complicated acute illness or injury     Details: Patient is positive for DVT in his right lower extremity.  We will start patient on Eliquis.  Patient encouraged to return immediately for chest pain, shortness of breath.    Risk  Prescription drug management.        Discussion below represents my analysis of pertinent findings related to patient's condition, differential diagnosis, treatment plan and final disposition.    Assessment includes with Differential diagnosis including but is not limited to: DVT, SVT, musculoskeletal pain.    Additional sources  Discussed/  obtained information from independent historians:   [] Spouse  [] Parent  [] Family member  [] Friend  [] EMS   [] Other:  External (non-ED) record review:   [] Inpatient record:   [] Office record:   [] Outpatient record:   [x] Prior Outpatient labs:   [x] Prior Outpatient radiology:   [] Primary Care record:   [] Outside ED record:   [] Other:   Patient's care impacted by:   [x] Diabetes  [x] Hypertension  [x] Hyperlipidemia  [] Hypothyroidism   [] Coronary Artery Disease  [] Congestive Heart Failure   [] COPD   [] Cancer   [] Obesity  [] GERD   [] Tobacco Abuse   [] Substance Abuse    [] Anxiety   [] Depression   [] Other:   Care significantly affected by Social Determinants of Health (housing and economic circumstances, unemployment)    [] Yes     [x] No   If yes, Patient's care significantly limited by  Social Determinants of Health including:   [] Inadequate housing   [] Low income   [] Alcoholism and drug addiction in family   [] Problems related to primary support group   [] Unemployment   [] Problems related to employment   [] Other Social Determinants of Health:     Orders placed during this visit:  No orders of the defined types were placed in this encounter.      I considered prescription management  with:   [] Pain medication  [] Antiviral  [] Antibiotic   [] Other:   Rationale:  Additional orders considered but not ordered:  The following testing was considered but ultimately not selected after discussion with patient/family:  ED Course:    ED Course as of 06/08/25 1146   Sat Jun 07, 2025   1440 Venous Duplex interpreted as ·  Acute right lower extremity deep vein thrombosis noted in the popliteal, posterior tibial and peroneal.  ·  All other right sided veins appeared normal.      [KG]   1500 Pharmacy staff was able to get patient his Eliquis starter pack.  Patient aware that he will need to follow-up with primary care.  Patient aware that he needs to continue taking the Eliquis.  Patient encouraged  to follow-up with his PCP regarding continuing his current medications. [KG]      ED Course User Index  [KG] Daylin Andrews APRN            DIAGNOSIS  Final diagnoses:   Acute deep vein thrombosis (DVT) of right lower extremity, unspecified vein       DISPOSITION    DISCHARGE    Patient discharged in stable condition.    Reviewed implications of results, diagnosis, meds, responsibility to follow up, warning signs and symptoms of possible worsening, potential complications and reasons to return to ER.    Patient/Family voiced understanding of above instructions.    Discussed plan for discharge, as there is no emergent indication for admission.  Pt/family is agreeable and understands need for follow up and possible repeat testing.  Pt/family is aware that discharge does not mean that nothing is wrong but that it indicates no emergency is currently present that requires admission and they must continue care with follow-up as given below or with a physician of their choice.     FOLLOW-UP  Hai Harrison MD  [facility] [place] [address]  [phone]               Medication List        New Prescriptions      Eliquis DVT/PE Starter Pack tablet therapy pack  Generic drug: Apixaban Starter Pack  Take 2 tablets by mouth every 12 hours for 7 days Followed by 1 tablet every 12 hours thereafter               Where to Get Your Medications        These medications were sent to [facility] - [address] [place] [address]      Hours: Monday to Friday 7 AM to 5:30 PM, Saturday & Sunday 8 AM to 4:30 PM Phone: [phone]   Eliquis DVT/PE Starter Pack tablet therapy pack          ED Disposition       ED Disposition   Discharge    Condition   Stable    Comment   --                   Daylin Andrews APRN  06/08/25 1144

## 2025-06-10 ENCOUNTER — TELEPHONE (OUTPATIENT)
Dept: CARDIOLOGY | Facility: CLINIC | Age: 60
End: 2025-06-10
Payer: MEDICAID

## 2025-06-10 NOTE — TELEPHONE ENCOUNTER
Caller: Adrien Sweet    Relationship to patient: Self    Best call back number: [phone]     Type of visit: HOSP F/U APPT    Requested date: NEXT AVAILABLE     If rescheduling, when is the original appointment: ORIGINAL WAS 1-10-25, R/S TO 10-3-25     Additional notes:PLEASE CONTACT PATIENT WITH A SUITABLE DATE.

## 2025-08-15 ENCOUNTER — LAB (OUTPATIENT)
Dept: LAB | Facility: HOSPITAL | Age: 60
End: 2025-08-15
Payer: MEDICAID

## 2025-08-15 ENCOUNTER — CONSULT (OUTPATIENT)
Dept: ONCOLOGY | Facility: CLINIC | Age: 60
End: 2025-08-15
Payer: MEDICAID

## 2025-08-15 VITALS
HEART RATE: 100 BPM | SYSTOLIC BLOOD PRESSURE: 112 MMHG | RESPIRATION RATE: 18 BRPM | TEMPERATURE: 97.8 F | DIASTOLIC BLOOD PRESSURE: 67 MMHG | HEIGHT: 70 IN | WEIGHT: 216 LBS | BODY MASS INDEX: 30.92 KG/M2 | OXYGEN SATURATION: 94 %

## 2025-08-15 DIAGNOSIS — I82.4Z1 ACUTE DEEP VEIN THROMBOSIS (DVT) OF DISTAL END OF RIGHT LOWER EXTREMITY: Primary | ICD-10-CM

## 2025-08-15 DIAGNOSIS — I82.4Z1 ACUTE DEEP VEIN THROMBOSIS (DVT) OF DISTAL END OF RIGHT LOWER EXTREMITY: ICD-10-CM

## 2025-08-15 LAB — PSA SERPL-MCNC: 1.99 NG/ML (ref 0–4)

## 2025-08-15 PROCEDURE — 85705 THROMBOPLASTIN INHIBITION: CPT

## 2025-08-15 PROCEDURE — 85306 CLOT INHIBIT PROT S FREE: CPT

## 2025-08-15 PROCEDURE — 86147 CARDIOLIPIN ANTIBODY EA IG: CPT

## 2025-08-15 PROCEDURE — 86146 BETA-2 GLYCOPROTEIN ANTIBODY: CPT

## 2025-08-15 PROCEDURE — 85613 RUSSELL VIPER VENOM DILUTED: CPT

## 2025-08-15 PROCEDURE — 81241 F5 GENE: CPT

## 2025-08-15 PROCEDURE — 81240 F2 GENE: CPT

## 2025-08-15 PROCEDURE — 85732 THROMBOPLASTIN TIME PARTIAL: CPT

## 2025-08-15 PROCEDURE — 85300 ANTITHROMBIN III ACTIVITY: CPT

## 2025-08-15 PROCEDURE — 85305 CLOT INHIBIT PROT S TOTAL: CPT

## 2025-08-15 PROCEDURE — 85302 CLOT INHIBIT PROT C ANTIGEN: CPT

## 2025-08-15 PROCEDURE — 85670 THROMBIN TIME PLASMA: CPT

## 2025-08-15 PROCEDURE — 85303 CLOT INHIBIT PROT C ACTIVITY: CPT

## 2025-08-15 PROCEDURE — 36415 COLL VENOUS BLD VENIPUNCTURE: CPT

## 2025-08-15 PROCEDURE — 84153 ASSAY OF PSA TOTAL: CPT

## 2025-08-16 LAB
CARDIOLIPIN IGG SER IA-ACNC: <9 GPL U/ML (ref 0–14)
CARDIOLIPIN IGM SER IA-ACNC: <9 MPL U/ML (ref 0–12)

## 2025-08-17 LAB
AT III ACT/NOR PPP CHRO: 140 % (ref 75–135)
PROT C ACT/NOR PPP CHRO: 129 % (ref 73–180)
PROT S ACT/NOR PPP: 120 % (ref 63–140)

## 2025-08-18 LAB
F5 GENE MUT ANL BLD/T: NORMAL
FACTOR II, DNA ANALYSIS: NORMAL

## 2025-08-19 LAB
APTT SCREEN TO CONFIRM RATIO: 1.1 RATIO (ref 0–1.34)
B2 GLYCOPROT1 IGA SER-ACNC: <9 GPI IGA UNITS (ref 0–25)
B2 GLYCOPROT1 IGG SER-ACNC: <9 GPI IGG UNITS (ref 0–20)
B2 GLYCOPROT1 IGM SER-ACNC: <9 GPI IGM UNITS (ref 0–32)
CONFIRM APTT/NORMAL: 39.8 SEC (ref 0–47.6)
DRVVT SCREEN TO CONFIRM RATIO: 1 RATIO (ref 0.8–1.2)
LA 2 SCREEN W REFLEX-IMP: ABNORMAL
MIXING DRVVT: 45.8 SEC (ref 0–40.4)
SCREEN APTT: 31.8 SEC (ref 0–43.5)
SCREEN DRVVT: 53.1 SEC (ref 0–47)
THROMBIN TIME: 21.2 SEC (ref 0–23)

## 2025-08-20 LAB
JAK2 P.V617F BLD/T QL: NORMAL
LAB DIRECTOR NAME PROVIDER: NORMAL
LABORATORY COMMENT REPORT: NORMAL
PROT C AG ACT/NOR PPP IA: 106 % (ref 60–150)
PROT S AG ACT/NOR PPP IA: 103 % (ref 60–150)
PROT S FREE AG ACT/NOR PPP IA: 133 % (ref 61–136)

## (undated) DEVICE — MODEL BT2000 P/N 700287-012KIT CONTENTS: MANIFOLD WITH SALINE AND CONTRAST PORTS, SALINE TUBING WITH SPIKE AND HAND SYRINGE, TRANSDUCER: Brand: BT2000 AUTOMATED MANIFOLD KIT

## (undated) DEVICE — ADULT, W/LG. BACK PAD, RADIOTRANSPARENT ELEMENT AND LEAD WIRE COMPATIBLE W/: Brand: DEFIBRILLATION ELECTRODES

## (undated) DEVICE — NC TREK NEO™ CORONARY DILATATION CATHETER 4.00 MM X 15 MM / RAPID-EXCHANGE: Brand: NC TREK NEO™

## (undated) DEVICE — GUIDE CATHETER: Brand: MACH1™

## (undated) DEVICE — CATH DIAG EXPO M/ PK 5F FL4/FR4 PIG

## (undated) DEVICE — HI-TORQUE WHISPER MS GUIDE WIRE .014 STRAIGHT TIP 3.0 CM X 190 CM: Brand: HI-TORQUE WHISPER

## (undated) DEVICE — CATH DIAG EXPO .045 FL3  5F 100CM

## (undated) DEVICE — MINI TREK CORONARY DILATATION CATHETER 2.0 MM X 12 MM / RAPID-EXCHANGE: Brand: MINI TREK

## (undated) DEVICE — HI-TORQUE VERSATURN F GUIDE WIRE FULLY COATED .014 STRAIGHT TIP 190 CM: Brand: HI-TORQUE VERSATURN

## (undated) DEVICE — TR BAND RADIAL ARTERY COMPRESSION DEVICE: Brand: TR BAND

## (undated) DEVICE — GW INQWIRE FC PTFE STD J/1.5 .035 260

## (undated) DEVICE — PK CATH CARD 10

## (undated) DEVICE — MODEL AT P65, P/N 701554-001KIT CONTENTS: HAND CONTROLLER, 3-WAY HIGH-PRESSURE STOPCOCK WITH ROTATING END AND PREMIUM HIGH-PRESSURE TUBING: Brand: ANGIOTOUCH® KIT

## (undated) DEVICE — INTRO SHEATH PRELUDE IDEAL SPRNG COIL 021 6F 23X80CM

## (undated) DEVICE — NC TREK NEO™ CORONARY DILATATION CATHETER 2.50 X 12 MM / RAPID-EXCHANGE: Brand: NC TREK NEO™

## (undated) DEVICE — GUIDELINER CATHETERS ARE INTENDED TO BE USED IN CONJUNCTION WITH GUIDE CATHETERS TO ACCESS DISCRETE REGIONS OF THE CORONARY AND/OR PERIPHERAL VASCULATURE, AND TO FACILITATE PLACEMENT OF INTERVENTIONAL DEVICES.: Brand: GUIDELINER® V3 CATHETER